# Patient Record
Sex: MALE | Race: WHITE | Employment: FULL TIME | URBAN - METROPOLITAN AREA
[De-identification: names, ages, dates, MRNs, and addresses within clinical notes are randomized per-mention and may not be internally consistent; named-entity substitution may affect disease eponyms.]

---

## 2021-04-13 ENCOUNTER — HOSPITAL ENCOUNTER (EMERGENCY)
Age: 51
Discharge: HOME | End: 2021-04-13
Attending: EMERGENCY MEDICINE
Payer: MEDICARE

## 2021-04-13 ENCOUNTER — APPOINTMENT (OUTPATIENT)
Dept: ULTRASOUND IMAGING | Age: 51
End: 2021-04-13
Attending: EMERGENCY MEDICINE
Payer: MEDICARE

## 2021-04-13 ENCOUNTER — APPOINTMENT (OUTPATIENT)
Dept: CT IMAGING | Age: 51
End: 2021-04-13
Attending: EMERGENCY MEDICINE
Payer: MEDICARE

## 2021-04-13 VITALS
RESPIRATION RATE: 16 BRPM | HEART RATE: 72 BPM | SYSTOLIC BLOOD PRESSURE: 139 MMHG | TEMPERATURE: 98.2 F | OXYGEN SATURATION: 94 % | DIASTOLIC BLOOD PRESSURE: 85 MMHG | WEIGHT: 199.96 LBS

## 2021-04-13 DIAGNOSIS — K85.90 ACUTE PANCREATITIS, UNSPECIFIED COMPLICATION STATUS, UNSPECIFIED PANCREATITIS TYPE: Primary | ICD-10-CM

## 2021-04-13 LAB
ALBUMIN SERPL-MCNC: 3.7 G/DL (ref 3.5–5)
ALBUMIN/GLOB SERPL: 0.9 {RATIO} (ref 1.1–2.2)
ALP SERPL-CCNC: 72 U/L (ref 45–117)
ALT SERPL-CCNC: 78 U/L (ref 12–78)
ANION GAP SERPL CALC-SCNC: 10 MMOL/L (ref 5–15)
AST SERPL-CCNC: 48 U/L (ref 15–37)
ATRIAL RATE: 68 BPM
BASOPHILS # BLD: 0 K/UL (ref 0–0.1)
BASOPHILS NFR BLD: 0 % (ref 0–1)
BILIRUB SERPL-MCNC: 2 MG/DL (ref 0.2–1)
BUN SERPL-MCNC: 8 MG/DL (ref 6–20)
BUN/CREAT SERPL: 7 (ref 12–20)
CALCIUM SERPL-MCNC: 9 MG/DL (ref 8.5–10.1)
CALCULATED P AXIS, ECG09: 27 DEGREES
CALCULATED R AXIS, ECG10: -16 DEGREES
CHLORIDE SERPL-SCNC: 98 MMOL/L (ref 97–108)
CO2 SERPL-SCNC: 28 MMOL/L (ref 21–32)
CREAT SERPL-MCNC: 1.12 MG/DL (ref 0.7–1.3)
DIAGNOSIS, 93000: NORMAL
DIFFERENTIAL METHOD BLD: ABNORMAL
EOSINOPHIL # BLD: 0.2 K/UL (ref 0–0.4)
EOSINOPHIL NFR BLD: 1 % (ref 0–7)
ERYTHROCYTE [DISTWIDTH] IN BLOOD BY AUTOMATED COUNT: 12.6 % (ref 11.5–14.5)
GLOBULIN SER CALC-MCNC: 4 G/DL (ref 2–4)
GLUCOSE SERPL-MCNC: 150 MG/DL (ref 65–100)
HCT VFR BLD AUTO: 43.9 % (ref 36.6–50.3)
HGB BLD-MCNC: 15.9 G/DL (ref 12.1–17)
IMM GRANULOCYTES # BLD AUTO: 0 K/UL (ref 0–0.04)
IMM GRANULOCYTES NFR BLD AUTO: 0 % (ref 0–0.5)
LIPASE SERPL-CCNC: 2712 U/L (ref 73–393)
LYMPHOCYTES # BLD: 1.1 K/UL (ref 0.8–3.5)
LYMPHOCYTES NFR BLD: 10 % (ref 12–49)
MCH RBC QN AUTO: 34.8 PG (ref 26–34)
MCHC RBC AUTO-ENTMCNC: 36.2 G/DL (ref 30–36.5)
MCV RBC AUTO: 96.1 FL (ref 80–99)
MONOCYTES # BLD: 0.7 K/UL (ref 0–1)
MONOCYTES NFR BLD: 6 % (ref 5–13)
NEUTS SEG # BLD: 9.2 K/UL (ref 1.8–8)
NEUTS SEG NFR BLD: 82 % (ref 32–75)
NRBC # BLD: 0 K/UL (ref 0–0.01)
NRBC BLD-RTO: 0 PER 100 WBC
P-R INTERVAL, ECG05: 176 MS
PLATELET # BLD AUTO: 122 K/UL (ref 150–400)
PMV BLD AUTO: 11.5 FL (ref 8.9–12.9)
POTASSIUM SERPL-SCNC: 3.8 MMOL/L (ref 3.5–5.1)
PROT SERPL-MCNC: 7.7 G/DL (ref 6.4–8.2)
Q-T INTERVAL, ECG07: 388 MS
QRS DURATION, ECG06: 102 MS
QTC CALCULATION (BEZET), ECG08: 412 MS
RBC # BLD AUTO: 4.57 M/UL (ref 4.1–5.7)
SODIUM SERPL-SCNC: 136 MMOL/L (ref 136–145)
TROPONIN I SERPL-MCNC: <0.05 NG/ML
VENTRICULAR RATE, ECG03: 68 BPM
WBC # BLD AUTO: 11.1 K/UL (ref 4.1–11.1)

## 2021-04-13 PROCEDURE — 84484 ASSAY OF TROPONIN QUANT: CPT

## 2021-04-13 PROCEDURE — 85025 COMPLETE CBC W/AUTO DIFF WBC: CPT

## 2021-04-13 PROCEDURE — 36415 COLL VENOUS BLD VENIPUNCTURE: CPT

## 2021-04-13 PROCEDURE — 96375 TX/PRO/DX INJ NEW DRUG ADDON: CPT

## 2021-04-13 PROCEDURE — 83690 ASSAY OF LIPASE: CPT

## 2021-04-13 PROCEDURE — 74177 CT ABD & PELVIS W/CONTRAST: CPT

## 2021-04-13 PROCEDURE — 93005 ELECTROCARDIOGRAM TRACING: CPT

## 2021-04-13 PROCEDURE — 76705 ECHO EXAM OF ABDOMEN: CPT

## 2021-04-13 PROCEDURE — 96374 THER/PROPH/DIAG INJ IV PUSH: CPT

## 2021-04-13 PROCEDURE — 74011000636 HC RX REV CODE- 636: Performed by: EMERGENCY MEDICINE

## 2021-04-13 PROCEDURE — 99284 EMERGENCY DEPT VISIT MOD MDM: CPT

## 2021-04-13 PROCEDURE — 80053 COMPREHEN METABOLIC PANEL: CPT

## 2021-04-13 PROCEDURE — 74011250636 HC RX REV CODE- 250/636: Performed by: EMERGENCY MEDICINE

## 2021-04-13 RX ORDER — HYDROCODONE BITARTRATE AND ACETAMINOPHEN 5; 325 MG/1; MG/1
1 TABLET ORAL
Qty: 12 TAB | Refills: 0 | Status: SHIPPED | OUTPATIENT
Start: 2021-04-13 | End: 2021-04-16

## 2021-04-13 RX ORDER — ONDANSETRON 2 MG/ML
8 INJECTION INTRAMUSCULAR; INTRAVENOUS
Status: COMPLETED | OUTPATIENT
Start: 2021-04-13 | End: 2021-04-13

## 2021-04-13 RX ORDER — MORPHINE SULFATE 4 MG/ML
4 INJECTION INTRAVENOUS ONCE
Status: COMPLETED | OUTPATIENT
Start: 2021-04-13 | End: 2021-04-13

## 2021-04-13 RX ORDER — ONDANSETRON 4 MG/1
4 TABLET, ORALLY DISINTEGRATING ORAL
Qty: 20 TAB | Refills: 0 | Status: ON HOLD | OUTPATIENT
Start: 2021-04-13 | End: 2022-02-12 | Stop reason: SDUPTHER

## 2021-04-13 RX ORDER — FAMOTIDINE 10 MG/ML
20 INJECTION INTRAVENOUS
Status: COMPLETED | OUTPATIENT
Start: 2021-04-13 | End: 2021-04-13

## 2021-04-13 RX ADMIN — SODIUM CHLORIDE 1000 ML: 9 INJECTION, SOLUTION INTRAVENOUS at 10:42

## 2021-04-13 RX ADMIN — FAMOTIDINE 20 MG: 10 INJECTION INTRAVENOUS at 10:47

## 2021-04-13 RX ADMIN — IOPAMIDOL 100 ML: 755 INJECTION, SOLUTION INTRAVENOUS at 11:08

## 2021-04-13 RX ADMIN — MORPHINE SULFATE 4 MG: 4 INJECTION INTRAVENOUS at 10:44

## 2021-04-13 RX ADMIN — ONDANSETRON 8 MG: 2 INJECTION INTRAMUSCULAR; INTRAVENOUS at 10:43

## 2021-04-13 NOTE — ED TRIAGE NOTES
Pt reports upper abdominal pain since yesterday morning. Pt denies N, V, D. Pt ate a sub sandwich yesterday afternoon and a bowl of soup last night.

## 2021-04-13 NOTE — ED NOTES
Patient resting on stretcher in no distress. Patient states pain is improved since receiving ordered medications. No further requests or concerns at this time.

## 2021-04-13 NOTE — ED NOTES
Patient medicated with ordered IV morphine, zofran, and pepcid. Patient tolerated well. IV fluids infusing via gravity without difficulty as ordered. Call bell remains in reach. Will continue to monitor.

## 2021-04-13 NOTE — ED PROVIDER NOTES
HPI the patient has a 1 day history of epigastric pain. At times the pain radiates into his back. He denies nausea/vomiting/diarrhea, chest pain, shortness of breath or other complaints. He denies having a history of ulcer disease, gallbladder or pancreatitis. History reviewed. No pertinent past medical history. Past Surgical History:   Procedure Laterality Date    HX APPENDECTOMY           History reviewed. No pertinent family history. Social History     Socioeconomic History    Marital status:      Spouse name: Not on file    Number of children: Not on file    Years of education: Not on file    Highest education level: Not on file   Occupational History    Not on file   Social Needs    Financial resource strain: Not on file    Food insecurity     Worry: Not on file     Inability: Not on file    Transportation needs     Medical: Not on file     Non-medical: Not on file   Tobacco Use    Smoking status: Current Some Day Smoker    Smokeless tobacco: Current User   Substance and Sexual Activity    Alcohol use: Yes     Comment: 5 drinks per week    Drug use: Never    Sexual activity: Not on file   Lifestyle    Physical activity     Days per week: Not on file     Minutes per session: Not on file    Stress: Not on file   Relationships    Social connections     Talks on phone: Not on file     Gets together: Not on file     Attends Sabianist service: Not on file     Active member of club or organization: Not on file     Attends meetings of clubs or organizations: Not on file     Relationship status: Not on file    Intimate partner violence     Fear of current or ex partner: Not on file     Emotionally abused: Not on file     Physically abused: Not on file     Forced sexual activity: Not on file   Other Topics Concern    Not on file   Social History Narrative    Not on file         ALLERGIES: Patient has no known allergies. Review of Systems   Constitutional: Negative for fever. HENT: Negative for voice change. Eyes: Negative for pain. Respiratory: Negative for cough and shortness of breath. Cardiovascular: Negative for chest pain. Gastrointestinal: Positive for abdominal pain. Negative for nausea and vomiting. Genitourinary: Negative for flank pain. Musculoskeletal: Negative for back pain. Skin: Negative for rash. Neurological: Negative for headaches. Psychiatric/Behavioral: Negative for confusion. Vitals:    04/13/21 0929   BP: (!) 169/107   Pulse: 95   Resp: 16   Temp: 98.2 °F (36.8 °C)   SpO2: 98%   Weight: 90.7 kg (199 lb 15.3 oz)            Physical Exam  Constitutional:       General: He is not in acute distress. Appearance: He is well-developed. HENT:      Head: Normocephalic. Neck:      Musculoskeletal: Normal range of motion. Cardiovascular:      Rate and Rhythm: Normal rate. Pulmonary:      Effort: Pulmonary effort is normal.   Abdominal:      Palpations: Abdomen is soft. Tenderness: There is abdominal tenderness in the epigastric area. Comments: Tenderness in the epigastrium   Musculoskeletal: Normal range of motion. Skin:     General: Skin is warm and dry. Capillary Refill: Capillary refill takes less than 2 seconds. Neurological:      Mental Status: He is alert. Psychiatric:         Behavior: Behavior normal.          MDM       Procedures ED EKG interpretation:  Rhythm: NSR, rate 68. Incomplete right bundle branch block. No acute ST changes. This EKG was interpreted by Almita Dee MD,ED Provider. I discussed the patient's lab results and diagnosis, pancreatitis, and that we normally admit patients in this situation. The patient would much prefer to try outpatient treatment. So the plan at the present time is to give him IV fluids, IV pain medication and if his pain improves here we will discharge him for gastroenterology follow-up.   He understands that if his symptoms get worse or are not improving in 24 hours that he should come back.

## 2021-04-13 NOTE — ED NOTES
IV access established and blood samples obtained for ordered testing. Patient tolerated well. Updated regarding plan of care and associated time constraints. Patient verbalizes understanding and agreement to care plan.

## 2021-04-13 NOTE — ED NOTES
Dr. Jovanni Licona at bedside to discuss results and further care management with patient. Patient declines admission to [facility] for pancreatitis. Patient to be discharged per Dr. Jovanni Licona.

## 2022-02-10 ENCOUNTER — APPOINTMENT (OUTPATIENT)
Dept: ULTRASOUND IMAGING | Age: 52
DRG: 439 | End: 2022-02-10
Attending: INTERNAL MEDICINE
Payer: COMMERCIAL

## 2022-02-10 ENCOUNTER — HOSPITAL ENCOUNTER (INPATIENT)
Age: 52
LOS: 2 days | Discharge: HOME | DRG: 439 | End: 2022-02-12
Attending: EMERGENCY MEDICINE | Admitting: INTERNAL MEDICINE
Payer: COMMERCIAL

## 2022-02-10 DIAGNOSIS — K85.00 IDIOPATHIC ACUTE PANCREATITIS WITHOUT INFECTION OR NECROSIS: Primary | ICD-10-CM

## 2022-02-10 DIAGNOSIS — K85.20 ALCOHOL-INDUCED ACUTE PANCREATITIS WITHOUT INFECTION OR NECROSIS: ICD-10-CM

## 2022-02-10 PROBLEM — R03.0 ELEVATED BP WITHOUT DIAGNOSIS OF HYPERTENSION: Status: ACTIVE | Noted: 2022-02-10

## 2022-02-10 PROBLEM — K85.90 ACUTE PANCREATITIS: Status: ACTIVE | Noted: 2022-02-10

## 2022-02-10 PROBLEM — F10.10 ALCOHOL ABUSE: Status: ACTIVE | Noted: 2022-02-10

## 2022-02-10 PROBLEM — K76.0 HEPATIC STEATOSIS: Status: ACTIVE | Noted: 2022-02-10

## 2022-02-10 PROBLEM — R79.89 ABNORMAL LFTS: Status: ACTIVE | Noted: 2022-02-10

## 2022-02-10 LAB
ALBUMIN SERPL-MCNC: 3.7 G/DL (ref 3.5–5)
ALBUMIN/GLOB SERPL: 0.9 {RATIO} (ref 1.1–2.2)
ALP SERPL-CCNC: 76 U/L (ref 45–117)
ALT SERPL-CCNC: 85 U/L (ref 12–78)
ANION GAP SERPL CALC-SCNC: 9 MMOL/L (ref 5–15)
AST SERPL-CCNC: 67 U/L (ref 15–37)
BASOPHILS # BLD: 0 K/UL (ref 0–0.1)
BASOPHILS NFR BLD: 1 % (ref 0–1)
BILIRUB SERPL-MCNC: 1.4 MG/DL (ref 0.2–1)
BUN SERPL-MCNC: 8 MG/DL (ref 6–20)
BUN/CREAT SERPL: 6 (ref 12–20)
CALCIUM SERPL-MCNC: 9.6 MG/DL (ref 8.5–10.1)
CHLORIDE SERPL-SCNC: 99 MMOL/L (ref 97–108)
CO2 SERPL-SCNC: 27 MMOL/L (ref 21–32)
CREAT SERPL-MCNC: 1.25 MG/DL (ref 0.7–1.3)
DIFFERENTIAL METHOD BLD: ABNORMAL
EOSINOPHIL # BLD: 0.1 K/UL (ref 0–0.4)
EOSINOPHIL NFR BLD: 2 % (ref 0–7)
ERYTHROCYTE [DISTWIDTH] IN BLOOD BY AUTOMATED COUNT: 13.4 % (ref 11.5–14.5)
GLOBULIN SER CALC-MCNC: 4.2 G/DL (ref 2–4)
GLUCOSE SERPL-MCNC: 205 MG/DL (ref 65–100)
HCT VFR BLD AUTO: 43.9 % (ref 36.6–50.3)
HGB BLD-MCNC: 15.5 G/DL (ref 12.1–17)
IMM GRANULOCYTES # BLD AUTO: 0 K/UL (ref 0–0.04)
IMM GRANULOCYTES NFR BLD AUTO: 0 % (ref 0–0.5)
LIPASE SERPL-CCNC: >3000 U/L (ref 73–393)
LYMPHOCYTES # BLD: 1.7 K/UL (ref 0.8–3.5)
LYMPHOCYTES NFR BLD: 26 % (ref 12–49)
MCH RBC QN AUTO: 34.3 PG (ref 26–34)
MCHC RBC AUTO-ENTMCNC: 35.3 G/DL (ref 30–36.5)
MCV RBC AUTO: 97.1 FL (ref 80–99)
MONOCYTES # BLD: 0.5 K/UL (ref 0–1)
MONOCYTES NFR BLD: 8 % (ref 5–13)
NEUTS SEG # BLD: 4.2 K/UL (ref 1.8–8)
NEUTS SEG NFR BLD: 63 % (ref 32–75)
NRBC # BLD: 0 K/UL (ref 0–0.01)
NRBC BLD-RTO: 0 PER 100 WBC
PLATELET # BLD AUTO: 148 K/UL (ref 150–400)
PMV BLD AUTO: 10.9 FL (ref 8.9–12.9)
POTASSIUM SERPL-SCNC: 3.7 MMOL/L (ref 3.5–5.1)
PROT SERPL-MCNC: 7.9 G/DL (ref 6.4–8.2)
RBC # BLD AUTO: 4.52 M/UL (ref 4.1–5.7)
SODIUM SERPL-SCNC: 135 MMOL/L (ref 136–145)
TRIGL SERPL-MCNC: 116 MG/DL (ref ?–150)
WBC # BLD AUTO: 6.6 K/UL (ref 4.1–11.1)

## 2022-02-10 PROCEDURE — 80053 COMPREHEN METABOLIC PANEL: CPT

## 2022-02-10 PROCEDURE — 96361 HYDRATE IV INFUSION ADD-ON: CPT

## 2022-02-10 PROCEDURE — 85025 COMPLETE CBC W/AUTO DIFF WBC: CPT

## 2022-02-10 PROCEDURE — 99283 EMERGENCY DEPT VISIT LOW MDM: CPT

## 2022-02-10 PROCEDURE — 74011250636 HC RX REV CODE- 250/636: Performed by: INTERNAL MEDICINE

## 2022-02-10 PROCEDURE — 76700 US EXAM ABDOM COMPLETE: CPT

## 2022-02-10 PROCEDURE — 83690 ASSAY OF LIPASE: CPT

## 2022-02-10 PROCEDURE — 74011000250 HC RX REV CODE- 250: Performed by: INTERNAL MEDICINE

## 2022-02-10 PROCEDURE — 94761 N-INVAS EAR/PLS OXIMETRY MLT: CPT

## 2022-02-10 PROCEDURE — 96375 TX/PRO/DX INJ NEW DRUG ADDON: CPT

## 2022-02-10 PROCEDURE — 84478 ASSAY OF TRIGLYCERIDES: CPT

## 2022-02-10 PROCEDURE — 96374 THER/PROPH/DIAG INJ IV PUSH: CPT

## 2022-02-10 PROCEDURE — 74011250636 HC RX REV CODE- 250/636: Performed by: EMERGENCY MEDICINE

## 2022-02-10 PROCEDURE — 36415 COLL VENOUS BLD VENIPUNCTURE: CPT

## 2022-02-10 PROCEDURE — 65270000029 HC RM PRIVATE

## 2022-02-10 PROCEDURE — 74011250637 HC RX REV CODE- 250/637: Performed by: INTERNAL MEDICINE

## 2022-02-10 RX ORDER — LORAZEPAM 2 MG/ML
2 INJECTION INTRAMUSCULAR
Status: DISCONTINUED | OUTPATIENT
Start: 2022-02-10 | End: 2022-02-12 | Stop reason: HOSPADM

## 2022-02-10 RX ORDER — HYDRALAZINE HYDROCHLORIDE 20 MG/ML
10 INJECTION INTRAMUSCULAR; INTRAVENOUS
Status: DISCONTINUED | OUTPATIENT
Start: 2022-02-10 | End: 2022-02-12 | Stop reason: HOSPADM

## 2022-02-10 RX ORDER — LORAZEPAM 2 MG/ML
1 INJECTION INTRAMUSCULAR ONCE
Status: COMPLETED | OUTPATIENT
Start: 2022-02-10 | End: 2022-02-10

## 2022-02-10 RX ORDER — HYDROMORPHONE HYDROCHLORIDE 2 MG/ML
1.5 INJECTION, SOLUTION INTRAMUSCULAR; INTRAVENOUS; SUBCUTANEOUS
Status: DISCONTINUED | OUTPATIENT
Start: 2022-02-10 | End: 2022-02-11

## 2022-02-10 RX ORDER — OXYCODONE AND ACETAMINOPHEN 5; 325 MG/1; MG/1
2 TABLET ORAL
Status: DISCONTINUED | OUTPATIENT
Start: 2022-02-10 | End: 2022-02-10

## 2022-02-10 RX ORDER — OXYCODONE HYDROCHLORIDE 5 MG/1
10 TABLET ORAL
Status: DISCONTINUED | OUTPATIENT
Start: 2022-02-10 | End: 2022-02-12 | Stop reason: HOSPADM

## 2022-02-10 RX ORDER — LORAZEPAM 2 MG/ML
4 INJECTION INTRAMUSCULAR
Status: DISCONTINUED | OUTPATIENT
Start: 2022-02-10 | End: 2022-02-12 | Stop reason: HOSPADM

## 2022-02-10 RX ORDER — ONDANSETRON 2 MG/ML
4 INJECTION INTRAMUSCULAR; INTRAVENOUS
Status: DISCONTINUED | OUTPATIENT
Start: 2022-02-10 | End: 2022-02-12 | Stop reason: HOSPADM

## 2022-02-10 RX ORDER — KETOROLAC TROMETHAMINE 30 MG/ML
15 INJECTION, SOLUTION INTRAMUSCULAR; INTRAVENOUS
Status: COMPLETED | OUTPATIENT
Start: 2022-02-10 | End: 2022-02-10

## 2022-02-10 RX ORDER — HYDROMORPHONE HYDROCHLORIDE 1 MG/ML
1 INJECTION, SOLUTION INTRAMUSCULAR; INTRAVENOUS; SUBCUTANEOUS ONCE
Status: COMPLETED | OUTPATIENT
Start: 2022-02-10 | End: 2022-02-10

## 2022-02-10 RX ORDER — ACETAMINOPHEN 650 MG/1
650 SUPPOSITORY RECTAL
Status: DISCONTINUED | OUTPATIENT
Start: 2022-02-10 | End: 2022-02-12 | Stop reason: HOSPADM

## 2022-02-10 RX ORDER — ONDANSETRON 2 MG/ML
4 INJECTION INTRAMUSCULAR; INTRAVENOUS
Status: COMPLETED | OUTPATIENT
Start: 2022-02-10 | End: 2022-02-10

## 2022-02-10 RX ORDER — SODIUM CHLORIDE 0.9 % (FLUSH) 0.9 %
5-40 SYRINGE (ML) INJECTION AS NEEDED
Status: DISCONTINUED | OUTPATIENT
Start: 2022-02-10 | End: 2022-02-12 | Stop reason: HOSPADM

## 2022-02-10 RX ORDER — ONDANSETRON 4 MG/1
4 TABLET, ORALLY DISINTEGRATING ORAL
Status: DISCONTINUED | OUTPATIENT
Start: 2022-02-10 | End: 2022-02-12 | Stop reason: HOSPADM

## 2022-02-10 RX ORDER — POLYETHYLENE GLYCOL 3350 17 G/17G
17 POWDER, FOR SOLUTION ORAL DAILY PRN
Status: DISCONTINUED | OUTPATIENT
Start: 2022-02-10 | End: 2022-02-12 | Stop reason: HOSPADM

## 2022-02-10 RX ORDER — SODIUM CHLORIDE, SODIUM LACTATE, POTASSIUM CHLORIDE, CALCIUM CHLORIDE 600; 310; 30; 20 MG/100ML; MG/100ML; MG/100ML; MG/100ML
100 INJECTION, SOLUTION INTRAVENOUS CONTINUOUS
Status: DISCONTINUED | OUTPATIENT
Start: 2022-02-10 | End: 2022-02-12 | Stop reason: HOSPADM

## 2022-02-10 RX ORDER — HYDROMORPHONE HYDROCHLORIDE 1 MG/ML
1 INJECTION, SOLUTION INTRAMUSCULAR; INTRAVENOUS; SUBCUTANEOUS
Status: COMPLETED | OUTPATIENT
Start: 2022-02-10 | End: 2022-02-10

## 2022-02-10 RX ORDER — ENOXAPARIN SODIUM 100 MG/ML
40 INJECTION SUBCUTANEOUS DAILY
Status: DISCONTINUED | OUTPATIENT
Start: 2022-02-11 | End: 2022-02-12 | Stop reason: HOSPADM

## 2022-02-10 RX ORDER — HYDROMORPHONE HYDROCHLORIDE 1 MG/ML
1 INJECTION, SOLUTION INTRAMUSCULAR; INTRAVENOUS; SUBCUTANEOUS
Status: DISCONTINUED | OUTPATIENT
Start: 2022-02-10 | End: 2022-02-10

## 2022-02-10 RX ORDER — ACETAMINOPHEN 325 MG/1
650 TABLET ORAL
Status: DISCONTINUED | OUTPATIENT
Start: 2022-02-10 | End: 2022-02-12 | Stop reason: HOSPADM

## 2022-02-10 RX ORDER — SODIUM CHLORIDE 0.9 % (FLUSH) 0.9 %
5-40 SYRINGE (ML) INJECTION EVERY 8 HOURS
Status: DISCONTINUED | OUTPATIENT
Start: 2022-02-10 | End: 2022-02-12 | Stop reason: HOSPADM

## 2022-02-10 RX ORDER — FENTANYL CITRATE 50 UG/ML
50 INJECTION, SOLUTION INTRAMUSCULAR; INTRAVENOUS
Status: DISCONTINUED | OUTPATIENT
Start: 2022-02-10 | End: 2022-02-10

## 2022-02-10 RX ADMIN — HYDROMORPHONE HYDROCHLORIDE 1 MG: 1 INJECTION, SOLUTION INTRAMUSCULAR; INTRAVENOUS; SUBCUTANEOUS at 13:40

## 2022-02-10 RX ADMIN — HYDROMORPHONE HYDROCHLORIDE 1 MG: 1 INJECTION, SOLUTION INTRAMUSCULAR; INTRAVENOUS; SUBCUTANEOUS at 12:33

## 2022-02-10 RX ADMIN — OXYCODONE 10 MG: 5 TABLET ORAL at 17:05

## 2022-02-10 RX ADMIN — SODIUM CHLORIDE, POTASSIUM CHLORIDE, SODIUM LACTATE AND CALCIUM CHLORIDE 1000 ML: 600; 310; 30; 20 INJECTION, SOLUTION INTRAVENOUS at 11:31

## 2022-02-10 RX ADMIN — HYDRALAZINE HYDROCHLORIDE 10 MG: 20 INJECTION INTRAMUSCULAR; INTRAVENOUS at 16:37

## 2022-02-10 RX ADMIN — FENTANYL CITRATE 50 MCG: 50 INJECTION, SOLUTION INTRAMUSCULAR; INTRAVENOUS at 11:33

## 2022-02-10 RX ADMIN — THIAMINE HYDROCHLORIDE: 100 INJECTION, SOLUTION INTRAMUSCULAR; INTRAVENOUS at 16:36

## 2022-02-10 RX ADMIN — ONDANSETRON 4 MG: 2 INJECTION INTRAMUSCULAR; INTRAVENOUS at 11:33

## 2022-02-10 RX ADMIN — HYDROMORPHONE HYDROCHLORIDE 1.5 MG: 2 INJECTION, SOLUTION INTRAMUSCULAR; INTRAVENOUS; SUBCUTANEOUS at 21:35

## 2022-02-10 RX ADMIN — SODIUM CHLORIDE, POTASSIUM CHLORIDE, SODIUM LACTATE AND CALCIUM CHLORIDE 125 ML/HR: 600; 310; 30; 20 INJECTION, SOLUTION INTRAVENOUS at 15:11

## 2022-02-10 RX ADMIN — ONDANSETRON 4 MG: 2 INJECTION INTRAMUSCULAR; INTRAVENOUS at 20:43

## 2022-02-10 RX ADMIN — HYDROMORPHONE HYDROCHLORIDE 1.5 MG: 2 INJECTION, SOLUTION INTRAMUSCULAR; INTRAVENOUS; SUBCUTANEOUS at 18:02

## 2022-02-10 RX ADMIN — SODIUM CHLORIDE, PRESERVATIVE FREE 10 ML: 5 INJECTION INTRAVENOUS at 21:22

## 2022-02-10 RX ADMIN — KETOROLAC TROMETHAMINE 15 MG: 30 INJECTION, SOLUTION INTRAMUSCULAR; INTRAVENOUS at 12:35

## 2022-02-10 RX ADMIN — HYDROMORPHONE HYDROCHLORIDE 1 MG: 1 INJECTION, SOLUTION INTRAMUSCULAR; INTRAVENOUS; SUBCUTANEOUS at 15:11

## 2022-02-10 RX ADMIN — OXYCODONE 10 MG: 5 TABLET ORAL at 20:36

## 2022-02-10 RX ADMIN — HYDROMORPHONE HYDROCHLORIDE 1 MG: 1 INJECTION, SOLUTION INTRAMUSCULAR; INTRAVENOUS; SUBCUTANEOUS at 12:06

## 2022-02-10 RX ADMIN — LORAZEPAM 1 MG: 2 INJECTION INTRAMUSCULAR; INTRAVENOUS at 18:01

## 2022-02-10 NOTE — ED TRIAGE NOTES
Patient presents ambulatory to treatment area with a steady gait. Patient complains of acute upper abdominal/epigastric pain that began about an hour ago. Patient states he has vomited twice and remains nauseated. Has history of pancreatitis and states this feels similar. Last food was last night; tried gingerale this morning, but vomited directly after ingesting.

## 2022-02-10 NOTE — PROGRESS NOTES
TRANSFER - IN REPORT:    Verbal report received from April Wood(name) on Marchelle Odanah  being received from ED(unit) for routine progression of care      Report consisted of patients Situation, Background, Assessment and   Recommendations(SBAR). Information from the following report(s) SBAR, Kardex, ED Summary, Intake/Output, MAR and Recent Results was reviewed with the receiving nurse. Opportunity for questions and clarification was provided. Assessment completed upon patients arrival to unit and care assumed.

## 2022-02-10 NOTE — H&P
[address]  [address] [place] [address]  [phone]    Admission History and Physical      NAME:  Toshia Mcelroy   :   1970   MRN:  [id number]     PCP:  Live Peace MD     Date of service:  2/10/2022         Subjective:     CHIEF COMPLAINT: Abdominal pain, nausea, vomiting    HISTORY OF PRESENT ILLNESS:     Mr. Shavon Haynes is a 46 y.o.  male who is admitted with recurrent pancreatitis. Mr. Shavon Haynes with past medical history of alcohol abuse, pancreatitis presented to ER complaining of epigastric abdominal pain, which started this morning. The pain is sharp, severe intensity associated with nausea and vomiting. Denies fever or diarrhea. Patient drinks alcohol most of the time, drinks beer and liquor. Past Medical History:   Diagnosis Date    Pancreatitis         Past Surgical History:   Procedure Laterality Date    HX APPENDECTOMY         Social History     Tobacco Use    Smoking status: Current Every Day Smoker     Packs/day: 0.25    Smokeless tobacco: Current User   Substance Use Topics    Alcohol use: Yes     Alcohol/week: 10.0 standard drinks     Types: 10 Shots of liquor per week        History reviewed. No pertinent family history. No Known Allergies     Prior to Admission medications    Medication Sig Start Date End Date Taking? Authorizing Provider   ondansetron (ZOFRAN ODT) 4 mg disintegrating tablet Take 1 Tab by mouth every eight (8) hours as needed for Nausea.   Patient not taking: Reported on 2/10/2022 4/13/21   Fred Cheng MD         Review of Systems:  (bold if positive, if negative)    Gen:  Eyes:  ENT:  CVS:  Pulm:  GI:  Abdominal pain, nausea, emesis,:  MS:  Skin:  Psych:  Endo:  Hem:  Renal:  Neuro:            Objective:      VITALS:    Vital signs reviewed; most recent are:    Visit Vitals  BP (!) 184/94 (BP 1 Location: Right upper arm, BP Patient Position: At rest)   Pulse 68   Temp 97.7 °F (36.5 °C)   Resp 20   Ht 6' 1\" (1.854 m)   Wt 86.2 kg (190 lb)   SpO2 97%   BMI 25.07 kg/m²     SpO2 Readings from Last 6 Encounters:   02/10/22 97%   04/13/21 94%            Intake/Output Summary (Last 24 hours) at 2/10/2022 1447  Last data filed at 2/10/2022 1203  Gross per 24 hour   Intake 1000 ml   Output --   Net 1000 ml            Exam:     Physical Exam:    Gen:  Well-developed, well-nourished, in no acute distress  HEENT:  Pink conjunctivae, PERRL, hearing intact to voice, moist mucous membranes  Neck:  Supple, without masses, thyroid non-tender  Resp:  No accessory muscle use, clear breath sounds without wheezes rales or rhonchi  Card:  No murmurs, normal S1, S2 without thrills, bruits or peripheral edema  Abd:  Soft, non-tender, non-distended, normoactive bowel sounds are present, no palpable organomegaly and no detectable hernias  Lymph:  No cervical or inguinal adenopathy  Musc:  No cyanosis or clubbing  Skin:  No rashes or ulcers, skin turgor is good  Neuro:  Cranial nerves are grossly intact, no focal motor weakness, follows commands appropriately  Psych:  Good insight, oriented to person, place and time, alert       Labs:    Recent Labs     02/10/22  1113   WBC 6.6   HGB 15.5   HCT 43.9   *     Recent Labs     02/10/22  1113   *   K 3.7   CL 99   CO2 27   *   BUN 8   CREA 1.25   CA 9.6   ALB 3.7   TBILI 1.4*   ALT 85*     No results found for: GLUCPOC  No results for input(s): PH, PCO2, PO2, HCO3, FIO2 in the last 72 hours. No results for input(s): INR, INREXT in the last 72 hours. Telemetry reviewed:   normal sinus rhythm       Assessment/Plan:    1. Acute alcoholic pancreatitis (7/38/2792), recurrent. Keep n.p.o., IV fluids, IV Dilaudid for pain control. Check triglyceride    2. Alcohol abuse (2/10/2022). Patient drinks beer and liquor. Denies history of withdrawal.  Start vitamins and CIWA protocol    3. Abnormal LFTs (2/10/2022). Most likely secondary to alcohol abuse.   Ultrasound of the liver shows hepatic steatosis. Strongly advised to stop drinking alcohol. 4.  Elevated BP without diagnosis of hypertension (2/10/2022). Denies history of hypertension. Most likely secondary to severe pain. Advocate control of pain.   Will give hydralazine IV as needed         Previous medical records reviewed     Risk of deterioration: high      Total time spent with patient: [address] [place] [address] discussed with: Patient, Nursing Staff and >50% of time spent in counseling and coordination of care    Discussed:  Care Plan    Prophylaxis:  Lovenox    Probable Disposition:  Home w/Family           ___________________________________________________    Attending Physician: Diamond Wallis MD

## 2022-02-10 NOTE — ED PROVIDER NOTES
The history is provided by the patient. Abdominal Pain   This is a recurrent problem. The current episode started 1 to 2 hours ago. The problem occurs constantly. The problem has been rapidly worsening. The pain is associated with vomiting and ETOH use. The pain is located in the epigastric region. Associated symptoms include anorexia and vomiting. Pertinent negatives include no fever and no chest pain. Nothing worsens the pain. The pain is relieved by nothing. Past workup includes CT scan. His past medical history is significant for pancreatitis. The patient's surgical history includes appendectomy. Past Medical History:   Diagnosis Date    Pancreatitis        Past Surgical History:   Procedure Laterality Date    HX APPENDECTOMY           History reviewed. No pertinent family history. Social History     Socioeconomic History    Marital status:      Spouse name: Not on file    Number of children: Not on file    Years of education: Not on file    Highest education level: Not on file   Occupational History    Not on file   Tobacco Use    Smoking status: Current Every Day Smoker     Packs/day: 0.25    Smokeless tobacco: Current User   Vaping Use    Vaping Use: Not on file   Substance and Sexual Activity    Alcohol use: Yes     Alcohol/week: 10.0 standard drinks     Types: 10 Shots of liquor per week    Drug use: Never    Sexual activity: Not on file   Other Topics Concern    Not on file   Social History Narrative    Not on file     Social Determinants of Health     Financial Resource Strain:     Difficulty of Paying Living Expenses: Not on file   Food Insecurity:     Worried About Running Out of Food in the Last Year: Not on file    Yisel of Food in the Last Year: Not on file   Transportation Needs:     Lack of Transportation (Medical): Not on file    Lack of Transportation (Non-Medical):  Not on file   Physical Activity:     Days of Exercise per Week: Not on file    Minutes of Exercise per Session: Not on file   Stress:     Feeling of Stress : Not on file   Social Connections:     Frequency of Communication with Friends and Family: Not on file    Frequency of Social Gatherings with Friends and Family: Not on file    Attends Church Services: Not on file    Active Member of Clubs or Organizations: Not on file    Attends Club or Organization Meetings: Not on file    Marital Status: Not on file   Intimate Partner Violence:     Fear of Current or Ex-Partner: Not on file    Emotionally Abused: Not on file    Physically Abused: Not on file    Sexually Abused: Not on file   Housing Stability:     Unable to Pay for Housing in the Last Year: Not on file    Number of [place] in the Last Year: Not on file    Unstable Housing in the Last Year: Not on file         ALLERGIES: Patient has no known allergies. Review of Systems   Constitutional: Negative for fever. Cardiovascular: Negative for chest pain. Gastrointestinal: Positive for abdominal pain, anorexia and vomiting. All other systems reviewed and are negative. Vitals:    02/10/22 1112 02/10/22 1125 02/10/22 1142 02/10/22 1210   BP: (!) 166/102      Pulse:       Resp:       Temp:       SpO2: 99% 100% 100% 100%   Weight:       Height:                Physical Exam  Vitals and nursing note reviewed. Constitutional:       General: He is in acute distress (due to pain). Appearance: He is well-developed. He is ill-appearing and diaphoretic. HENT:      Head: Normocephalic and atraumatic. Eyes:      Conjunctiva/sclera: Conjunctivae normal.   Neck:      Trachea: No tracheal deviation. Cardiovascular:      Rate and Rhythm: Normal rate and regular rhythm. Pulmonary:      Effort: Pulmonary effort is normal. No respiratory distress. Abdominal:      General: There is no distension. Palpations: Abdomen is soft. Tenderness: There is abdominal tenderness in the epigastric area. There is guarding.  There is no rebound. Negative signs include Means's sign. Musculoskeletal:         General: No deformity. Normal range of motion. Cervical back: Neck supple. Skin:     General: Skin is warm. Neurological:      Mental Status: He is alert. Cranial Nerves: No cranial nerve deficit. Psychiatric:         Behavior: Behavior normal.          MDM     46 y.o. male presents with second episode of acute pancreatitis. Significant pain which is difficult to control. Will admit for aggressive pain control and bowel rest as he has had high analgesia requirement and I suspect he would fail another attempt at outpatient symptom control. Procedures    Perfect Serve Consult for Admission  12:24 PM    ED Room Number: OTY26/79  Patient Name and age:  Delbert Small 46 y.o.  male  Working Diagnosis:   1.  Idiopathic acute pancreatitis without infection or necrosis        COVID-19 Suspicion:  no  Sepsis present:  no  Reassessment needed: no  Code Status:  Full Code  Readmission: no  Isolation Requirements:  no  Recommended Level of Care:  med/surg  Department:[facility] - [phone]

## 2022-02-10 NOTE — ED NOTES
Dr. Jose Sousa has spoken with patient. Patient OK with admission. Proceeding with admission process.

## 2022-02-10 NOTE — ED NOTES
TRANSFER - OUT REPORT:    Verbal report given to [facility]) on Delbert Small  being transferred to [facility], rm 506(unit) for routine progression of care       Report consisted of patients Situation, Background, Assessment and   Recommendations(SBAR). Information from the following report(s) SBAR, ED Summary, Intake/Output, MAR and Recent Results was reviewed with the receiving nurse. Lines:   Peripheral IV 02/10/22 Left Antecubital (Active)        Opportunity for questions and clarification was provided.       Patient transported with:   [facility] transport team.

## 2022-02-10 NOTE — PROGRESS NOTES
1535  Pt in 10/10 pain. Hospitalist notified, waiting on new orders to be put in. Pt said they were giving him 1mg dilaudid in ED, which wasn't helping. MD increased dose and frequency. Will continue to monitor pt's pain and respiratory status. 1637  PRN hydralazine given for /91. Recheck 1815 - /93. Dilaudid administered for 10/10 pain, will continue to monitor. 1655  Pt still in pain, with no other PRN options. MD notified - oxycodone ordered for pain, 1 time dose of ativan ordered for anxiety. Will continue to monitor pt.     1900  Bedside and Verbal shift change report given to Toña Reardon RN (oncoming nurse) by John Garcia RN (offgoing nurse). Report included the following information SBAR, Kardex, Intake/Output, MAR and Recent Results.

## 2022-02-10 NOTE — PROGRESS NOTES
2/10/2022  Reason for Admission:  Acute pancreatitis                   RUR Score:          None yet patient just admitted           Plan for utilizing home health:     No history, patient unlikely to need this admission     PCP: First and Last name:  Dipti Bills MD   Name of Practice:    Are you a current patient: Yes/No: Yes   Approximate date of last visit: Yesterday 2/9   Can you participate in a virtual visit with your PCP:                     Current Advanced Directive/Advance Care Plan: Full Code    Healthcare Decision Maker:   Click here to complete [address] including selection of the Healthcare Decision Maker Relationship (ie \"Primary\")           Patient's NOK is his wife Brooke Willoughby                  Transition of Care Plan:                    Patient lives at home with his wife and son in a 3 story home with a few steps to enter. Prior to admission patient is independent with ADLs and drives. No DME, no history of home health. Patient's emergency contact is his wife Brooke Willoughby who can be reached at [phone]. She will be taking him home from the hospital when he is ready for discharge. Patient sees Dr Madonna Harris for primary care and last saw the doctor yesterday. He gets prescriptions at [facility] [address] and they are typically covered by insurance. No identified needs at this time, we will continue to follow. Transition of Care Plan   1. Continue medical management/treatment  2. Home with family assistance when ready  3. Wife will transport at discharge  4. Follow up with PCP, specialties as needed  5. CM will continue to follow    Care Management Interventions  PCP Verified by CM: Yes (Dr Paula Christianson)  Mode of Transport at Discharge:  Other (see comment) (wife)  Transition of Care Consult (CM Consult): Discharge Planning  Physical Therapy Consult: No  Occupational Therapy Consult: No  Speech Therapy Consult: No  Support Systems: Spouse/Significant Other,Child(malathi)  Confirm Follow Up Transport: Family  Discharge Location  Patient Expects to be Discharged to[de-identified] Home with family assistance    MIKHAIL Soler

## 2022-02-10 NOTE — ED NOTES
Discharge or Transfer Assessment: Patient A&O x4 and in no distress. Physical re-examination reveals mild improvement in pts condition with reassessment of vital signs completed at the time of admission transfer and/or discharge.    Verification of hospital location [facility] and room 506 completed with EMS provider [facility] transport team.

## 2022-02-11 LAB
ALBUMIN SERPL-MCNC: 3.3 G/DL (ref 3.5–5)
ALBUMIN/GLOB SERPL: 0.9 {RATIO} (ref 1.1–2.2)
ALP SERPL-CCNC: 71 U/L (ref 45–117)
ALT SERPL-CCNC: 64 U/L (ref 12–78)
ANION GAP SERPL CALC-SCNC: 8 MMOL/L (ref 5–15)
AST SERPL-CCNC: 40 U/L (ref 15–37)
BILIRUB SERPL-MCNC: 1.4 MG/DL (ref 0.2–1)
BUN SERPL-MCNC: 6 MG/DL (ref 6–20)
BUN/CREAT SERPL: 7 (ref 12–20)
CALCIUM SERPL-MCNC: 8.6 MG/DL (ref 8.5–10.1)
CHLORIDE SERPL-SCNC: 102 MMOL/L (ref 97–108)
CO2 SERPL-SCNC: 25 MMOL/L (ref 21–32)
CREAT SERPL-MCNC: 0.91 MG/DL (ref 0.7–1.3)
ERYTHROCYTE [DISTWIDTH] IN BLOOD BY AUTOMATED COUNT: 14 % (ref 11.5–14.5)
GLOBULIN SER CALC-MCNC: 3.8 G/DL (ref 2–4)
GLUCOSE SERPL-MCNC: 174 MG/DL (ref 65–100)
HCT VFR BLD AUTO: 43.1 % (ref 36.6–50.3)
HGB BLD-MCNC: 15.4 G/DL (ref 12.1–17)
MCH RBC QN AUTO: 34.8 PG (ref 26–34)
MCHC RBC AUTO-ENTMCNC: 35.7 G/DL (ref 30–36.5)
MCV RBC AUTO: 97.5 FL (ref 80–99)
NRBC # BLD: 0 K/UL (ref 0–0.01)
NRBC BLD-RTO: 0 PER 100 WBC
PLATELET # BLD AUTO: 129 K/UL (ref 150–400)
PMV BLD AUTO: 10.9 FL (ref 8.9–12.9)
POTASSIUM SERPL-SCNC: 3.8 MMOL/L (ref 3.5–5.1)
PROT SERPL-MCNC: 7.1 G/DL (ref 6.4–8.2)
RBC # BLD AUTO: 4.42 M/UL (ref 4.1–5.7)
SODIUM SERPL-SCNC: 135 MMOL/L (ref 136–145)
WBC # BLD AUTO: 13.5 K/UL (ref 4.1–11.1)

## 2022-02-11 PROCEDURE — 36415 COLL VENOUS BLD VENIPUNCTURE: CPT

## 2022-02-11 PROCEDURE — 85027 COMPLETE CBC AUTOMATED: CPT

## 2022-02-11 PROCEDURE — 94761 N-INVAS EAR/PLS OXIMETRY MLT: CPT

## 2022-02-11 PROCEDURE — 65270000029 HC RM PRIVATE

## 2022-02-11 PROCEDURE — 74011250637 HC RX REV CODE- 250/637: Performed by: INTERNAL MEDICINE

## 2022-02-11 PROCEDURE — 74011000250 HC RX REV CODE- 250: Performed by: INTERNAL MEDICINE

## 2022-02-11 PROCEDURE — 80053 COMPREHEN METABOLIC PANEL: CPT

## 2022-02-11 PROCEDURE — 74011250636 HC RX REV CODE- 250/636: Performed by: INTERNAL MEDICINE

## 2022-02-11 RX ORDER — HYDROMORPHONE HYDROCHLORIDE 1 MG/ML
1 INJECTION, SOLUTION INTRAMUSCULAR; INTRAVENOUS; SUBCUTANEOUS
Status: DISCONTINUED | OUTPATIENT
Start: 2022-02-11 | End: 2022-02-12 | Stop reason: HOSPADM

## 2022-02-11 RX ADMIN — HYDROMORPHONE HYDROCHLORIDE 1.5 MG: 2 INJECTION, SOLUTION INTRAMUSCULAR; INTRAVENOUS; SUBCUTANEOUS at 01:18

## 2022-02-11 RX ADMIN — SODIUM CHLORIDE, POTASSIUM CHLORIDE, SODIUM LACTATE AND CALCIUM CHLORIDE 125 ML/HR: 600; 310; 30; 20 INJECTION, SOLUTION INTRAVENOUS at 01:12

## 2022-02-11 RX ADMIN — HYDROMORPHONE HYDROCHLORIDE 1 MG: 1 INJECTION, SOLUTION INTRAMUSCULAR; INTRAVENOUS; SUBCUTANEOUS at 20:32

## 2022-02-11 RX ADMIN — SODIUM CHLORIDE, PRESERVATIVE FREE 10 ML: 5 INJECTION INTRAVENOUS at 20:32

## 2022-02-11 RX ADMIN — THIAMINE HYDROCHLORIDE: 100 INJECTION, SOLUTION INTRAMUSCULAR; INTRAVENOUS at 18:11

## 2022-02-11 RX ADMIN — OXYCODONE 10 MG: 5 TABLET ORAL at 09:33

## 2022-02-11 RX ADMIN — OXYCODONE 10 MG: 5 TABLET ORAL at 13:58

## 2022-02-11 RX ADMIN — SODIUM CHLORIDE, PRESERVATIVE FREE 10 ML: 5 INJECTION INTRAVENOUS at 01:18

## 2022-02-11 RX ADMIN — SODIUM CHLORIDE, POTASSIUM CHLORIDE, SODIUM LACTATE AND CALCIUM CHLORIDE 125 ML/HR: 600; 310; 30; 20 INJECTION, SOLUTION INTRAVENOUS at 09:25

## 2022-02-11 RX ADMIN — HYDROMORPHONE HYDROCHLORIDE 1.5 MG: 2 INJECTION, SOLUTION INTRAMUSCULAR; INTRAVENOUS; SUBCUTANEOUS at 10:54

## 2022-02-11 RX ADMIN — SODIUM CHLORIDE, PRESERVATIVE FREE 10 ML: 5 INJECTION INTRAVENOUS at 06:40

## 2022-02-11 RX ADMIN — OXYCODONE 10 MG: 5 TABLET ORAL at 18:11

## 2022-02-11 RX ADMIN — OXYCODONE 10 MG: 5 TABLET ORAL at 23:58

## 2022-02-11 RX ADMIN — HYDROMORPHONE HYDROCHLORIDE 1.5 MG: 2 INJECTION, SOLUTION INTRAMUSCULAR; INTRAVENOUS; SUBCUTANEOUS at 06:39

## 2022-02-11 RX ADMIN — HYDROMORPHONE HYDROCHLORIDE 1.5 MG: 2 INJECTION, SOLUTION INTRAMUSCULAR; INTRAVENOUS; SUBCUTANEOUS at 15:16

## 2022-02-11 NOTE — ADT AUTH CERT NOTES
Comments  Comment        [address]     FACILITY NPI : [id number]  FACILITY TAX ID : [id number]     [facility]  SF 5M1 MED SURG 1  [address] [facility]  [phone]            Patient Name :Jann Acevedo   : 1970 (51 yrs)  MRN : [id number]     Patient Mailing Address [address] [facility] [16] , [address]                                                               .         Insurance Plan Payor: BLUE CROSS. Primary Coverage Subscriber ID : [id number]         Current Patient Class : INPATIENT  Admit Date : 2/10/2022     REQUESTED LEVEL OF CARE: INPATIENT [101]                                                           Diagnosis : Acute pancreatitis                          ICD10 Code : Acute pancreatitis [K85.90]        Admitting and Attending Info:  Admitting Provider : Michelle Elizondo MD     NPI: [id number]  Admitting Provider Phone. [phone]  Admitting Provider Address: SAME AS FACILITY          Patient Demographics    Patient Name   Katherin Rico Legal Sex   Male    1970 Address   [place] [address] Phone   [phone] [facility]   [phone] [facility]       Hospital Account    Name Acct ID Class Status Primary Coverage   Katherin Rico [id number] INPATIENT Open BLUE CROSS - [address] [facility]            Guarantor Account (for Hospital Account [de-identified])    Name Relation to Pt Service Area Active?  Acct Type   Katherin Rico [facility] Yes Personal/Family   Address Phone     [address] [place] [address] [phone])              Coverage Information (for Hospital Account [de-identified])    F/O Payor/Plan Subscriber  Subscriber Sex Precert #   BLUE CROSS/VA BLUE CROSS OUT OF STATE [address] [facility]    Subscriber Subscriber #   Katherin Rico [id number]   [facility] # Group Name   [id number]    Address Phone   [address]    Policy Number Status Effective Date Benefits Phone   [id number] -  -   Auth/Cert               Diagnosis     Codes Comments   Idiopathic acute pancreatitis without infection or necrosis  ICD-10-CM: K85.00   ICD-9-CM: 577.0             Admission Information    Arrival Date/Time: 02/10/2022 1057 Admit Date/Time: 02/10/2022 1103 IP Adm.  Date/Time: 02/10/2022 1228   Admission Type: Emergency Point of Origin: Non-health Care Facility/self Admit Category:    Means of Arrival: Car Primary Service: Medicine Secondary Service: N/A   Transfer Source:  Service Area: [facility] Unit: [place] [address] MED SURG 1   Admit Provider: Melania Richards MD Attending Provider: Pierce Prader, MD Referring Provider:      Admission Information    Attending Provider Admission Dx Admitted on   Melania Richards MD Acute pancreatitis 02/10/22   Service Isolation Code Status   Medicine -- Full Code   Allergies Advance Care Planning    No Known Allergies Jump to the Activity       Admission Information    Unit/Bed: [facility] [place] [address] MED SURG  Service: Medicine   Admitting provider: Melania Richards MD Phone: [phone]   Attending provider: Melania Richards MD Phone: [phone]   PCP: Alex Mack MD Phone: [phone]   Admission dx:  Patient class: I   Admission type: ER       Patient Demographics    Patient Name   Kelli Mcdonough   [id number] Legal Sex   Male    1970 Address   [place] [address] Phone   [phone] (Home)   [phone] (Mobile)     H&P Notes       H&P by Melania Richards MD at 02/10/22 475 documented on ED to Hosp-Admission (Current) from 2/10/2022 in [facility] [place] [address] MED SURG 1    Author: Melania Richards MD Author Type: Physician Filed: 02/10/22 7309   Note Status: Signed Cosign: Cosign Not Required Date of Service: 02/10/22 1447   : Melania Richards MD (Physician)                  [address] [place] [address] [address] [facility] [address]  [address] [place] [address] 19  [phone]     Admission History and Physical        NAME:            Lisbeth Kat   :               1970   MRN:               [id number]      PCP:                Jase Martin MD      Date of service:         2/10/2022          Subjective:      CHIEF COMPLAINT: Abdominal pain, nausea, vomiting     HISTORY OF PRESENT ILLNESS:     Mr. Lenore Villeda is a 46 y.o.  male who is admitted with recurrent pancreatitis. Mr. Lenore Villeda with past medical history of alcohol abuse, pancreatitis presented to ER complaining of epigastric abdominal pain, which started this morning. The pain is sharp, severe intensity associated with nausea and vomiting. Denies fever or diarrhea. Patient drinks alcohol most of the time, drinks beer and liquor.             Past Medical History:   Diagnosis Date    Pancreatitis                 Past Surgical History:   Procedure Laterality Date    HX APPENDECTOMY             Social History      Tobacco Use    Smoking status: Current Every Day Smoker       Packs/day: 0.25    Smokeless tobacco: Current User   Substance Use Topics    Alcohol use: Yes       Alcohol/week: 10.0 standard drinks       Types: 10 Shots of liquor per week         History reviewed. No pertinent family history.      No Known Allergies              Prior to Admission medications    Medication Sig Start Date End Date Taking? Authorizing Provider   ondansetron (ZOFRAN ODT) 4 mg disintegrating tablet Take 1 Tab by mouth every eight (8) hours as needed for Nausea.   Patient not taking: Reported on 2/10/2022 4/13/21     Park Ybarra MD            Review of Systems:  (bold if positive, if negative)     Gen:    Eyes:    ENT:    CVS:    Pulm:    GI:  Abdominal pain, nausea, emesis,  :    MS:    Skin:    Psych:    Endo:    Hem:    Renal:    Neuro:               Objective:       VITALS:    Vital signs reviewed; most recent are:     Visit Vitals  BP (!) 184/94 (BP 1 Location: Right upper arm, BP Patient Position: At rest)   Pulse 68   Temp 97.7 °F (36.5 °C)   Resp 20   Ht 6' 1\" (1.854 m)   Wt 86.2 kg (190 lb)   SpO2 97%   BMI 25.07 kg/m²          SpO2 Readings from Last 6 Encounters:   02/10/22 97%   04/13/21 94%              Intake/Output Summary (Last 24 hours) at 2/10/2022 1447  Last data filed at 2/10/2022 1203      Gross per 24 hour   Intake 1000 ml   Output --   Net 1000 ml               Exam:      Physical Exam:     Gen:  Well-developed, well-nourished, in no acute distress  HEENT:  Pink conjunctivae, PERRL, hearing intact to voice, moist mucous membranes  Neck:  Supple, without masses, thyroid non-tender  Resp:  No accessory muscle use, clear breath sounds without wheezes rales or rhonchi  Card:  No murmurs, normal S1, S2 without thrills, bruits or peripheral edema  Abd:  Soft, non-tender, non-distended, normoactive bowel sounds are present, no palpable organomegaly and no detectable hernias  Lymph:  No cervical or inguinal adenopathy  Musc:  No cyanosis or clubbing  Skin:  No rashes or ulcers, skin turgor is good  Neuro:  Cranial nerves are grossly intact, no focal motor weakness, follows commands appropriately  Psych:  Good insight, oriented to person, place and time, alert        Labs:         Recent Labs     02/10/22  1113   WBC 6.6   HGB 15.5   HCT 43.9   *          Recent Labs     02/10/22  1113   *   K 3.7   CL 99   CO2 27   *   BUN 8   CREA 1.25   CA 9.6   ALB 3.7   TBILI 1.4*   ALT 85*      No results found for: GLUCPOC  No results for input(s): PH, PCO2, PO2, HCO3, FIO2 in the last 72 hours. No results for input(s): INR, INREXT in the last 72 hours.     Telemetry reviewed:   normal sinus rhythm         Assessment/Plan:    1. Acute alcoholic pancreatitis (2/55/7065), recurrent. Keep n.p.o., IV fluids, IV Dilaudid for pain control. Check triglyceride     2. Alcohol abuse (2/10/2022). Patient drinks beer and liquor.   Denies history of withdrawal.  Start vitamins and CIWA protocol     3. Abnormal LFTs (2/10/2022). Most likely secondary to alcohol abuse. Ultrasound of the liver shows hepatic steatosis. Strongly advised to stop drinking alcohol.     4.  Elevated BP without diagnosis of hypertension (2/10/2022). Denies history of hypertension. Most likely secondary to severe pain. Advocate control of pain.   Will give hydralazine IV as needed           Previous medical records reviewed      Risk of deterioration: high         Total time spent with patient: [address] discussed with: Patient, Nursing Staff and >50% of time spent in counseling and coordination of care     Discussed:  Care Plan     Prophylaxis:  Lovenox     Probable Disposition:  Home w/Family                                                                                        ___________________________________________________     Attending Physician:   Loralie Severs, MD                Patient Demographics    [facility]   [id number] Legal Sex   Male    1970 Address   [place] [address] Phone   [phone] (Home)   [phone] (Mobile)   CSN:   [id number]     [facility] Date: Admit Time Room Bed   Feb 10, 2022 11:03  [05804] 01 [[id number]]       Attending Providers    Provider Pager From To   Geno Montilla MD  02/10/22 02/10/22   Paulino Ralph MD  02/10/22      Emergency Contact(s)    Name Relation Home Work Mobile   Anamika Baker Spouse   [phone]     Utilization Reviews         Pancreatitis - Care Day 2 (2022) by Getachew Paul RN       Review Entered Review Status   2022 12:30 Completed      Criteria Review      Care Day: 2 Care Date: 2022 Level of Care: Inpatient Floor    Guideline Day 2    Level Of Care    (X) ICU or floor    2022 12:30:35 EST by Janey Mata Tasia      medical    Clinical Status    ( ) * Hemodynamic stability    2/11/2022 12:30:35 EST by Kyung Mandel      98.5;106;157/101;16;95% RA    ( ) * Pain absent or reduced    2/11/2022 12:30:35 EST by Kyung Mandel      managed    Activity    (X) Activity as tolerated    2/11/2022 12:30:35 EST by Kyung Mandel      up ad hai    Routes    ( ) Possible oral hydration    2/11/2022 12:30:35 EST by Kyung Mandel      npo    (X) Possible oral medications    2/11/2022 12:30:35 EST by Kyung Mandel      oxyCODONE IR (ROXICODONE) tablet 10 mg  Dose: 10 mg  Freq: EVERY 4 HOURS AS NEEDED Route: PO X 1    ( ) Oral diet as tolerated    2/11/2022 12:30:35 EST by Kyung Mandel      npo    Interventions    (X) * NG tube absent    (X) Laboratory tests    2/11/2022 12:30:35 EST by Kyung Mandel      attached    (X) Observe for alcohol withdrawal if indicated    2/11/2022 12:30:35 EST by Kyung Mandel      CIWA    Medications    (X) Parenteral analgesics    2/11/2022 12:30:35 EST by Kyung Mandel      HYDROmorphone (DILAUDID) injection 1.5 mg  Dose: 1.5 mg  Freq: EVERY 3 HOURS AS NEEDED Route: IV X 3    * Milestone   Additional Notes   Day 2   2/11/22   In pt medical         Medications   HYDROmorphone (DILAUDID) injection 1.5 mg   Dose: 1.5 mg   Freq: EVERY 3 HOURS AS NEEDED Route: IV X 3      lactated Ringers infusion   Rate: 125 mL/hr Dose: 125 mL/hr   Freq: CONTINUOUS Route: IV      oxyCODONE IR (ROXICODONE) tablet 10 mg   Dose: 10 mg   Freq: EVERY 4 HOURS AS NEEDED Route: PO X 1      Vitals   98.5;106;157/101;16;95% RA      Labs      WBC 13.5 (H)   MCH 34.8 (H)   PLATELET 814 (L)   Sodium 135 (L)   Glucose 174 (H)   BUN/Creatinine ratio 7 (L)   Bilirubin, total 1.4 (H)   Albumin 3.3 (L)   Globulin 3.8   A-G Ratio 0.9 (L)   ALT 64   AST 40 (H)      Attending   Chief Complaint[de-identified] Patient was seen and examined as a follow up for pancreatitis. Hyun Noguera was reviewed. c/o severe abdominal pain       Abd:  Soft, non-tender, non-distended, normoactive bowel sounds are present, no palpable organomegaly and no detectable hernias   Acute alcoholic pancreatitis (7/53/4220), recurrent. Martina Luis n.p.o., IV fluids, IV Dilaudid for pain control.  Triglyceride are Ok            Pancreatitis - Care Day 1 (2/10/2022) by Linda Nagy RN       Review Entered Review Status   2/10/2022 16:33 Completed      Criteria Review      Care Day: 1 Care Date: 2/10/2022 Level of Care: Inpatient Floor    Guideline Day 1    Level Of Care    (X) ICU or floor    2/10/2022 16:33:46 EST by Linda Nagy      medical    Clinical Status    (X) * Clinical Indications met    2/10/2022 16:33:46 EST by Linda Nagy      Acute alcoholic pancreatitis (8/58/0810), recurrent. Martina Smith n.p.o., IV fluids, IV Dilaudid for pain control.  Check triglyceride    (X) Pain, fever, or vomiting    2/10/2022 16:33:46 EST by Linda Nagy      pain    Activity    ( ) Bed rest    2/10/2022 16:33:46 EST by Linda Nagy      up with assistance    Routes    (X) NPO, enteral, or oral feeds    2/10/2022 16:33:46 EST by Linda Nagy      npo    (X) IV fluids    2/10/2022 16:33:46 EST by Linda Nagy      lactated Ringers infusion  Rate: 125 mL/hr Dose: 125 mL/hr  Freq: CONTINUOUS Route: IV    (X) IV medications    2/10/2022 16:33:46 EST by Linda Nagy      ondansetron (ZOFRAN) injection 4 mg  Dose: 4 mg  Freq: EVERY 1 HOUR AS NEEDED Route: IV X1    HYDROmorphone (DILAUDID) injection 1 mg  Dose: 1 mg  Freq: EVERY 1 HOUR AS NEEDED Route: IV X2    Interventions    (X) Abdominal imaging    2/10/2022 16:33:46 EST by Linda Nagy      Abdominal Ultrasound  IMPRESSION  Moderate hepatic steatosis.     (X) Laboratory tests    2/10/2022 16:33:46 EST by Linda Nagy      attached    (X) Possible oxygen and pulse oximetry    2/10/2022 16:33:46 EST by Linda Nagy      routine    Medications    (X) Parenteral analgesics    2/10/2022 16:33:46 EST by Linda Nagy      HYDROmorphone (DILAUDID) injection 1 mg  Dose: 1 mg  Freq: EVERY 1 HOUR AS NEEDED Route: IV X2    * Milestone   Additional Notes   Day 1   2/10/22   In pt medical      Medications   lactated Ringers infusion   Rate: 125 mL/hr Dose: 125 mL/hr   Freq: CONTINUOUS Route: IV      ondansetron (ZOFRAN) injection 4 mg   Dose: 4 mg   Freq: EVERY 1 HOUR AS NEEDED Route: IV X1      HYDROmorphone (DILAUDID) injection 1 mg   Dose: 1 mg   Freq: EVERY 1 HOUR AS NEEDED Route: IV X2      ketorolac (TORADOL) injection 15 mg   Dose: 15 mg   Freq: NOW Route: IV      lactated ringers bolus infusion 1,000 mL   Dose: 1,000 mL   Freq: NOW Route: IV      Vitals   97.7;68;184/81;20;100% RA      Labs   Abdominal Ultrasound   IMPRESSION   Moderate hepatic steatosis. MCH 34.3 (H)   PLATELET 146 (L)   Sodium 135 (L)   Glucose 205 (H)   BUN/Creatinine ratio 6 (L)   Bilirubin, total 1.4 (H)   Globulin 4.2 (H)   A-G Ratio 0.9 (L)   ALT 85 (H)   AST 67 (H)   Lipase >3,000 (H)      ED   Patient presents ambulatory to treatment area with a steady gait.  Patient complains of acute upper abdominal/epigastric pain that began about an hour ago. Jori Mccormick states he has vomited twice and remains nauseated.  Has history of pancreatitis and states this feels similar.  Last food was last night; tried gingerale this morning, but vomited directly after ingesting. This is a recurrent problem. The current episode started 1 to 2 hours ago. The problem occurs constantly. The problem has been rapidly worsening. The pain is associated with vomiting and ETOH use. The pain is located in the epigastric region. Associated symptoms include anorexia and vomiting. Pertinent negatives include no fever and no chest pain. Nothing worsens the pain. The pain is relieved by nothing. Past workup includes CT scan. His past medical history is significant for pancreatitis. The patient's surgical history includes appendectomy.       H&P   CHIEF COMPLAINT: Abdominal pain, nausea, vomiting       HISTORY OF PRESENT ILLNESS:      Mr. Michelle Valentino is a 46 y.o. [facility] male who is admitted with recurrent pancreatitis.  Mr. Michelle Valentino with past medical history of alcohol abuse, pancreatitis presented to ER complaining of epigastric abdominal pain, which started this morning.  The pain is sharp, severe intensity associated with nausea and vomiting.  Denies fever or diarrhea.  Patient drinks alcohol most of the time, drinks beer and liquor. Physical Exam:       Gen:  Well-developed, well-nourished, in no acute distress   HEENT:  Pink conjunctivae, PERRL, hearing intact to voice, moist mucous membranes   Neck:  Supple, without masses, thyroid non-tender   Resp:  No accessory muscle use, clear breath sounds without wheezes rales or rhonchi   Card:  No murmurs, normal S1, S2 without thrills, bruits or peripheral edema   Abd:  Soft, non-tender, non-distended, normoactive bowel sounds are present, no palpable organomegaly and no detectable hernias   Lymph:  No cervical or inguinal adenopathy   Musc:  No cyanosis or clubbing   Skin:  No rashes or ulcers, skin turgor is good   Neuro:  Cranial nerves are grossly intact, no focal motor weakness, follows commands appropriately   Psych:  Good insight, oriented to person, place and time, alert      Assessment/Plan:    1.  Acute alcoholic pancreatitis (6/75/5862), recurrent. Tereasa Gammons n.p.o., IV fluids, IV Dilaudid for pain control.  Check triglyceride       2.  Alcohol abuse (2/10/2022).  Patient drinks beer and liquor.  Denies history of withdrawal.  Start vitamins and CIWA protocol       3.  Abnormal LFTs (2/10/2022).  Most likely secondary to alcohol abuse.  Ultrasound of the liver shows hepatic steatosis.  Strongly advised to stop drinking alcohol.       4.  Elevated BP without diagnosis of hypertension (2/10/2022).   Denies history of hypertension.  Most likely secondary to severe pain.  Advocate control of pain.  Will give hydralazine IV as needed                        Pancreatitis - Clinical Indications for Admission to Inpatient Care by Katharina Browning RN       Review Entered Review Status   2/10/2022 16:29 Completed      Criteria Review      Clinical Indications for Admission to Inpatient Care    Most Recent :  Katharina Browning Most Recent Date: 2/10/2022 54:63:00 EST    (X) Admission is indicated for  1 or more  of the following  (1) (2) (3) (4) (5) (6) (7):       (X) Acute pancreatitis, [A] as indicated by  2 or more  of the following :          (X) Abdominal pain (eg, epigastric, left upper quadrant)          2/10/2022 16:29:24 EST by Katharina Browning            pancreatitis presented to ER complaining of epigastric abdominal pain, which started this morning.          (X) Serum amylase or serum lipase greater than 3 times the upper limit of normal, or urinary trypsinogen-2          greater than 50 ng/mL [B] (9)          2/10/2022 16:29:24 EST by Katharina Browning            Lipase>3,000 (H)

## 2022-02-11 NOTE — PROGRESS NOTES
Physician Progress Note      Charley Guthrie  CSN #:                  [id number]  :                       1970  ADMIT DATE:       2/10/2022 11:03 AM  DISCH DATE:  RESPONDING  PROVIDER #:        Radha Ivey MD          QUERY TEXT:    Good afternoon  this patient was admitted on 02/10 with acute pancreatitis    If possible, please document in progress notes and discharge summary if you are evaluating and/or treating any of the following: The medical record reflects the following:  Risk Factors: Alcohol abuse, recurrent pancreatitis  Clinical Indicators: Elevated WBC-13.5 on admission, tachycardia up to 106 (2/4 SIRS) Lipase >3000, LFTs 85/67  Treatment: NPO, US, labs, VS monitoring, IVF, IV dilaudid for pain    Thank You  Angel Pizarro RN CDI  [phone]  Options provided:  -- SIRS of non-infectious origin due to acute pancreatitis  without acute organ dysfunction  -- Other - I will add my own diagnosis  -- Disagree - Not applicable / Not valid  -- Disagree - Clinically unable to determine / Unknown  -- Refer to Clinical Documentation Reviewer    PROVIDER RESPONSE TEXT:    This patient has SIRS of non-infectious origin due to acute pancreatitis) without acute organ dysfunction.     Query created by: Kathleen Smith on 2022 2:05 PM      Electronically signed by:  Radha Ivey MD 2022 2:13 PM

## 2022-02-11 NOTE — PROGRESS NOTES
Ezio Navarroelsen [facility] 79  [address] [facility], [place], [address] [place] [address]  [phone]      Medical Progress Note      NAME: Wilmar Yañez   :  1970  MRM:  [id number]    Date of service: 2022  11:34 AM       Assessment and Plan:   1. Acute alcoholic pancreatitis (), recurrent. Keep n.p.o., IV fluids, IV Dilaudid for pain control. Triglyceride are Ok      2. Alcohol abuse (2/10/2022). Patient drinks beer and liquor. Denies history of withdrawal.  cont vitamins and CIWA protocol     3. Abnormal LFTs (2/10/2022). Most likely secondary to alcohol abuse. Ultrasound of the liver shows hepatic steatosis. Strongly advised to stop drinking alcohol.     4.  Elevated BP without diagnosis of hypertension (2/10/2022). Denies history of hypertension. Most likely secondary to severe pain. hydralazine IV as needed          Subjective:     Chief Complaint[de-identified] Patient was seen and examined as a follow up for pancreatitis. Chart was reviewed. c/o severe abdominal pain     ROS:  (bold if positive, if negative)    Tolerating PT  Tolerating Diet        Objective:     Last 24hrs VS reviewed since prior progress note. Most recent are:    Visit Vitals  BP (!) 157/101 (BP 1 Location: Left upper arm, BP Patient Position: Sitting; At rest)   Pulse (!) 106   Temp 98.5 °F (36.9 °C)   Resp 18   Ht 6' 1\" (1.854 m)   Wt 93.1 kg (205 lb 4.8 oz)   SpO2 95%   BMI 27.09 kg/m²     SpO2 Readings from Last 6 Encounters:   22 95%   21 94%            Intake/Output Summary (Last 24 hours) at 2022 1134  Last data filed at 2022 0600  Gross per 24 hour   Intake 2425 ml   Output 0 ml   Net 2425 ml        Physical Exam:    Gen:  Well-developed, well-nourished, in no acute distress  HEENT:  Pink conjunctivae, PERRL, hearing intact to voice, moist mucous membranes  Neck:  Supple, without masses, thyroid non-tender  Resp:  No accessory muscle use, clear breath sounds without wheezes rales or rhonchi  Card:  No murmurs, normal S1, S2 without thrills, bruits or peripheral edema  Abd:  Soft, non-tender, non-distended, normoactive bowel sounds are present, no palpable organomegaly and no detectable hernias  Lymph:  No cervical or inguinal adenopathy  Musc:  No cyanosis or clubbing  Skin:  No rashes or ulcers, skin turgor is good  Neuro:  Cranial nerves are grossly intact, no focal motor weakness, follows commands appropriately  Psych:  Good insight, oriented to person, place and time, alert  __________________________________________________________________  Medications Reviewed: (see below)  Medications:     Current Facility-Administered Medications   Medication Dose Route Frequency    sodium chloride (NS) flush 5-40 mL  5-40 mL IntraVENous Q8H    sodium chloride (NS) flush 5-40 mL  5-40 mL IntraVENous PRN    acetaminophen (TYLENOL) tablet 650 mg  650 mg Oral Q6H PRN    Or    acetaminophen (TYLENOL) suppository 650 mg  650 mg Rectal Q6H PRN    polyethylene glycol (MIRALAX) packet 17 g  17 g Oral DAILY PRN    ondansetron (ZOFRAN ODT) tablet 4 mg  4 mg Oral Q8H PRN    Or    ondansetron (ZOFRAN) injection 4 mg  4 mg IntraVENous Q6H PRN    enoxaparin (LOVENOX) injection 40 mg  40 mg SubCUTAneous DAILY    lactated Ringers infusion  125 mL/hr IntraVENous CONTINUOUS    LORazepam (ATIVAN) injection 2 mg  2 mg IntraVENous Q1H PRN    LORazepam (ATIVAN) injection 4 mg  4 mg IntraVENous Q1H PRN    0.9% sodium chloride 1,000 mL with mvi (adult no. 4 with vit K) 10 mL, thiamine 013 mg, folic acid 1 mg infusion   IntraVENous Q24H    hydrALAZINE (APRESOLINE) 20 mg/mL injection 10 mg  10 mg IntraVENous Q6H PRN    HYDROmorphone (DILAUDID) injection 1.5 mg  1.5 mg IntraVENous Q3H PRN    oxyCODONE IR (ROXICODONE) tablet 10 mg  10 mg Oral Q4H PRN        Lab Data Reviewed: (see below)  Lab Review:     Recent Labs     02/11/22  0113 02/10/22  1113   WBC 13.5* 6.6   HGB 15.4 15.5   HCT 43.1 43.9   * 148* Recent Labs     02/11/22  0113 02/10/22  1113   * 135*   K 3.8 3.7    99   CO2 25 27   * 205*   BUN 6 8   CREA 0.91 1.25   CA 8.6 9.6   ALB 3.3* 3.7   TBILI 1.4* 1.4*   ALT 64 85*     No results found for: GLUCPOC  No results for input(s): PH, PCO2, PO2, HCO3, FIO2 in the last 72 hours. No results for input(s): INR, INREXT in the last 72 hours. All Micro Results     None          I have reviewed notes of prior 24hr. Other pertinent lab: Total time spent with patient: 28 I personally reviewed chart, notes, data and current medications in the medical record. I have personally examined and treated the patient at bedside during this period.                  Care Plan discussed with: Patient, Nursing Staff and >50% of time spent in counseling and coordination of care    Discussed:  Care Plan    Prophylaxis:  Lovenox    Disposition:  Home w/Family           ___________________________________________________    Attending Physician: Irma Alfaro MD

## 2022-02-11 NOTE — PROGRESS NOTES
2/11/2022  Case Management Progress Note    11:56 AM  Patient is 46year old male admitted 2/10 with pancreatitis  Patient's RUR is 4% green/low risk for readmission  Covid test: none this admission  Chart reviewed--patient discussed at IDR rounds   Per rounds this morning patient is still in significant pain, will likely be with us another day or two. No noted discharge needs, plan remains for patient to return home with family when ready. Will continue to follow. Transition of Care Plan   1. Continue medical management/treatment  2. Home with family when ready for discharge   3. Family will transport   4. Follow up with PCP, specialties as needed  5.  CM will continue to follow    MIKHAIL Garcia

## 2022-02-11 NOTE — PROGRESS NOTES
Problem: Falls - Risk of  Goal: *Absence of Falls  Outcome: Progressing Towards Goal  Note: Fall Risk Interventions:            Medication Interventions: Patient to call before getting OOB,Teach patient to arise slowly                   Problem: Pain  Goal: *Control of Pain  Outcome: Progressing Towards Goal     Problem: Patient Education: Go to Patient Education Activity  Goal: Patient/Family Education  Outcome: Progressing Towards Goal     Problem: Pain  Goal: *Control of Pain  Outcome: Progressing Towards Goal     Problem: Alcohol Withdrawal  Goal: *STG: Participates in treatment plan  Outcome: Progressing Towards Goal  Goal: *STG: Remains safe in hospital  Outcome: Progressing Towards Goal  Goal: *STG: Seeks staff when symptoms of withdrawal increase  Outcome: Progressing Towards Goal  Goal: *STG: Complies with medication therapy  Outcome: Progressing Towards Goal  Goal: *STG: Attends activities and groups  Outcome: Progressing Towards Goal  Goal: *STG: Will identify negative impact of chemical dependency including the use of tobacco, alcohol, and other substances  Outcome: Progressing Towards Goal  Goal: *STG: Verbalizes abstinence as an achievable goal  Outcome: Progressing Towards Goal  Goal: *STG: Agrees to participate in outpatient after care program to support ongoing mental health  Outcome: Progressing Towards Goal  Goal: *STG: Able to indentify relapse triggers including interpersonal/social and familial factors  Outcome: Progressing Towards Goal  Goal: *STG: Identify lifestyle changes to support long term sobriety such as vocation, employment, education, and legal issues  Outcome: Progressing Towards Goal  Goal: *STG: Maintains appropriate nutrition and hydration  Outcome: Progressing Towards Goal  Goal: *STG: Vital signs within defined limits  Outcome: Progressing Towards Goal  Goal: *STG/LTG: Relapse prevention plan in place to include housing/aftercare, leisure activities, and spirituality  Outcome: Progressing Towards Goal  Goal: Interventions  Outcome: Progressing Towards Goal     Problem: Patient Education: Go to Patient Education Activity  Goal: Patient/Family Education  Outcome: Progressing Towards Goal     Problem: Patient Education: Go to Patient Education Activity  Goal: Patient/Family Education  Outcome: Progressing Towards Goal

## 2022-02-11 NOTE — ROUTINE PROCESS
Called for pain med.stated pain scale on the 9/10 pt stated he is due for oral pain med. Roxicodone 10 mg po given. Pt state I need to come back at 2100 because according to the white board his iv pain medication is due and he wants to be top on his pain medications and want his pain medications  on scheduled. Educated and instructed pt on pain medication during shift changed.

## 2022-02-12 VITALS
TEMPERATURE: 98.9 F | HEART RATE: 95 BPM | RESPIRATION RATE: 18 BRPM | BODY MASS INDEX: 27.21 KG/M2 | HEIGHT: 73 IN | SYSTOLIC BLOOD PRESSURE: 145 MMHG | OXYGEN SATURATION: 93 % | WEIGHT: 205.3 LBS | DIASTOLIC BLOOD PRESSURE: 89 MMHG

## 2022-02-12 LAB
ALBUMIN SERPL-MCNC: 2.8 G/DL (ref 3.5–5)
ALBUMIN/GLOB SERPL: 0.7 {RATIO} (ref 1.1–2.2)
ALP SERPL-CCNC: 59 U/L (ref 45–117)
ALT SERPL-CCNC: 43 U/L (ref 12–78)
ANION GAP SERPL CALC-SCNC: 6 MMOL/L (ref 5–15)
AST SERPL-CCNC: 27 U/L (ref 15–37)
BASOPHILS # BLD: 0 K/UL (ref 0–0.1)
BASOPHILS NFR BLD: 0 % (ref 0–1)
BILIRUB SERPL-MCNC: 2.1 MG/DL (ref 0.2–1)
BUN SERPL-MCNC: 8 MG/DL (ref 6–20)
BUN/CREAT SERPL: 8 (ref 12–20)
CALCIUM SERPL-MCNC: 8.7 MG/DL (ref 8.5–10.1)
CHLORIDE SERPL-SCNC: 98 MMOL/L (ref 97–108)
CO2 SERPL-SCNC: 27 MMOL/L (ref 21–32)
CREAT SERPL-MCNC: 1.02 MG/DL (ref 0.7–1.3)
DIFFERENTIAL METHOD BLD: ABNORMAL
EOSINOPHIL # BLD: 0 K/UL (ref 0–0.4)
EOSINOPHIL NFR BLD: 0 % (ref 0–7)
ERYTHROCYTE [DISTWIDTH] IN BLOOD BY AUTOMATED COUNT: 14.1 % (ref 11.5–14.5)
GLOBULIN SER CALC-MCNC: 4 G/DL (ref 2–4)
GLUCOSE SERPL-MCNC: 131 MG/DL (ref 65–100)
HCT VFR BLD AUTO: 40.8 % (ref 36.6–50.3)
HGB BLD-MCNC: 14.4 G/DL (ref 12.1–17)
IMM GRANULOCYTES # BLD AUTO: 0.1 K/UL (ref 0–0.04)
IMM GRANULOCYTES NFR BLD AUTO: 1 % (ref 0–0.5)
LIPASE SERPL-CCNC: 677 U/L (ref 73–393)
LYMPHOCYTES # BLD: 1.1 K/UL (ref 0.8–3.5)
LYMPHOCYTES NFR BLD: 7 % (ref 12–49)
MAGNESIUM SERPL-MCNC: 1.5 MG/DL (ref 1.6–2.4)
MCH RBC QN AUTO: 34.9 PG (ref 26–34)
MCHC RBC AUTO-ENTMCNC: 35.3 G/DL (ref 30–36.5)
MCV RBC AUTO: 98.8 FL (ref 80–99)
MONOCYTES # BLD: 1 K/UL (ref 0–1)
MONOCYTES NFR BLD: 7 % (ref 5–13)
NEUTS SEG # BLD: 13.1 K/UL (ref 1.8–8)
NEUTS SEG NFR BLD: 85 % (ref 32–75)
NRBC # BLD: 0 K/UL (ref 0–0.01)
NRBC BLD-RTO: 0 PER 100 WBC
PHOSPHATE SERPL-MCNC: 2.2 MG/DL (ref 2.6–4.7)
PLATELET # BLD AUTO: 106 K/UL (ref 150–400)
PMV BLD AUTO: 11.4 FL (ref 8.9–12.9)
POTASSIUM SERPL-SCNC: 3.6 MMOL/L (ref 3.5–5.1)
PROT SERPL-MCNC: 6.8 G/DL (ref 6.4–8.2)
RBC # BLD AUTO: 4.13 M/UL (ref 4.1–5.7)
SODIUM SERPL-SCNC: 131 MMOL/L (ref 136–145)
WBC # BLD AUTO: 15.4 K/UL (ref 4.1–11.1)

## 2022-02-12 PROCEDURE — 83735 ASSAY OF MAGNESIUM: CPT

## 2022-02-12 PROCEDURE — 36415 COLL VENOUS BLD VENIPUNCTURE: CPT

## 2022-02-12 PROCEDURE — 74011000250 HC RX REV CODE- 250: Performed by: INTERNAL MEDICINE

## 2022-02-12 PROCEDURE — 94761 N-INVAS EAR/PLS OXIMETRY MLT: CPT

## 2022-02-12 PROCEDURE — 84100 ASSAY OF PHOSPHORUS: CPT

## 2022-02-12 PROCEDURE — 85025 COMPLETE CBC W/AUTO DIFF WBC: CPT

## 2022-02-12 PROCEDURE — 83690 ASSAY OF LIPASE: CPT

## 2022-02-12 PROCEDURE — 74011250636 HC RX REV CODE- 250/636: Performed by: INTERNAL MEDICINE

## 2022-02-12 PROCEDURE — 80053 COMPREHEN METABOLIC PANEL: CPT

## 2022-02-12 PROCEDURE — 74011250637 HC RX REV CODE- 250/637: Performed by: INTERNAL MEDICINE

## 2022-02-12 RX ORDER — OXYCODONE HYDROCHLORIDE 10 MG/1
10 TABLET ORAL
Qty: 12 TABLET | Refills: 0 | Status: SHIPPED | OUTPATIENT
Start: 2022-02-12 | End: 2022-02-15

## 2022-02-12 RX ORDER — LABETALOL 100 MG/1
100 TABLET, FILM COATED ORAL EVERY 12 HOURS
Qty: 60 TABLET | Refills: 0 | Status: SHIPPED | OUTPATIENT
Start: 2022-02-12

## 2022-02-12 RX ORDER — PHENOBARBITAL SODIUM 65 MG/ML
30 INJECTION INTRAMUSCULAR EVERY 12 HOURS
Status: DISCONTINUED | OUTPATIENT
Start: 2022-02-12 | End: 2022-02-12 | Stop reason: HOSPADM

## 2022-02-12 RX ORDER — ONDANSETRON 4 MG/1
4 TABLET, ORALLY DISINTEGRATING ORAL
Qty: 20 TABLET | Refills: 0 | Status: SHIPPED | OUTPATIENT
Start: 2022-02-12

## 2022-02-12 RX ADMIN — SODIUM PHOSPHATE, MONOBASIC, MONOHYDRATE: 276; 142 INJECTION, SOLUTION INTRAVENOUS at 09:04

## 2022-02-12 RX ADMIN — OXYCODONE 10 MG: 5 TABLET ORAL at 08:41

## 2022-02-12 RX ADMIN — SODIUM CHLORIDE, PRESERVATIVE FREE 10 ML: 5 INJECTION INTRAVENOUS at 04:08

## 2022-02-12 RX ADMIN — OXYCODONE 10 MG: 5 TABLET ORAL at 12:06

## 2022-02-12 RX ADMIN — SODIUM CHLORIDE, POTASSIUM CHLORIDE, SODIUM LACTATE AND CALCIUM CHLORIDE 125 ML/HR: 600; 310; 30; 20 INJECTION, SOLUTION INTRAVENOUS at 02:24

## 2022-02-12 RX ADMIN — OXYCODONE 10 MG: 5 TABLET ORAL at 04:07

## 2022-02-12 NOTE — DISCHARGE INSTRUCTIONS
HOSPITALIST DISCHARGE INSTRUCTIONS  NAME: Lolita Chicas   :  1970   MRN:  [id number]     Date/Time:  2022 11:23 AM    ADMIT DATE: 2/10/2022     DISCHARGE DATE: 2022     ADMITTING DIAGNOSIS:  Alcohol induced pancreatitis, high blood pressure     DISCHARGE DIAGNOSIS:  same    MEDICATIONS:  See after visit summary       · It is important that you take the medication exactly as they are prescribed. · Keep your medication in the bottles provided by the pharmacist and keep a list of the medication names, dosages, and times to be taken in your wallet. · Do not take other medications without consulting your doctor     Pain Management: per above medications    What to do at Home    Recommended diet:  Leticia Paden, low fat diet. No dairy     Recommended activity: Activity as tolerated    1) Return to the hospital if you feel worse    2) If you experience any of the following symptoms then please call your primary care physician or return to the emergency room if you cannot get hold of your doctor:  Fever, chills, nausea, vomiting, diarrhea, change in mentation, falling, bleeding, shortness of breath, chest pain, severe headache, severe abdominal pain,     3) Stop drinking alcohol    4) Follow up with your doctors     Follow Up: Follow-up Information     Follow up With Specialties Details Why Contact Info    Dominique Mg MD [facility] Medicine Schedule an appointment as soon as possible for a visit in 11 days  [address]  [phone]              Information obtained by :  I understand that if any problems occur once I am at home I am to contact my physician. I understand and acknowledge receipt of the instructions indicated above.                                                                                                                                            Physician's or R.N.'s Signature Date/Time                                                                                                                                              Patient or Representative Signature                                                          Date/Time      Patient Education        Learning About Alcohol Use Disorder  What is alcohol use disorder? Alcohol use disorder means that a person drinks alcohol even though it causes harm to themselves or others. It can range from mild to severe. The more signs of this disorder you have, the more severe it may be. Moderate to severe alcohol use disorder is sometimes called addiction. People who have it may find it hard to control their use of alcohol. People who have this disorder may argue with others about how much they're drinking. Their job may be affected because of drinking. They may drink when it's dangerous or illegal, such as when they drive. They also may have a strong need, or craving, to drink. They may feel like they must drink just to get by. Their drinking may increase their risk of getting hurt or being in a car crash. Over time, drinking too much alcohol may cause health problems. These may include high blood pressure, liver problems, or problems with digestion. What are the signs? Maybe you've wondered about your alcohol habits, or how to tell if your drinking is becoming a problem. Here are some of the signs of alcohol use disorder. You may have it if you have two or more of the following signs:  · You drink larger amounts of alcohol than you ever meant to. Or you've been drinking for a longer time than you ever meant to. · You can't cut down or control your use. Or you constantly wish you could cut down. · You spend a lot of time getting or drinking alcohol or recovering from its effects. · You have strong cravings for alcohol. · You can no longer do your main jobs at work, at school, or at home.   · You keep drinking alcohol, even though your use hurts your relationships. · You have stopped doing important activities because of your alcohol use. · You drink alcohol in situations where doing so is dangerous. · You keep drinking alcohol even though you know it's causing health problems. · You need more and more alcohol to get the same effect, or you get less effect from the same amount over time. This is called tolerance. · You have uncomfortable symptoms when you stop drinking alcohol or use less. This is called withdrawal.  Alcohol use disorder can range from mild to severe. The more signs you have, the more severe the disorder may be. Moderate to severe alcohol use disorder is sometimes called addiction. You might not realize that your drinking is a problem. You might not drink large amounts when you drink. Or you might go for days or weeks between drinking episodes. But even if you don't drink very often, your drinking could still be harmful and put you at risk. How is alcohol use disorder treated? Getting help is up to you. But you don't have to do it alone. There are many people and kinds of treatments that can help. Treatment for alcohol use disorder can include:  · Group therapy, one or more types of counseling, and alcohol education. · Medicines that help to:  ? Reduce withdrawal symptoms and help you safely stop drinking. ? Reduce cravings for alcohol. · Support groups. These groups include Alcoholics Anonymous and Eye[employer] (Self-Management and Recovery Training). Some people are able to stop or cut back on drinking with help from a counselor. People who have moderate to severe alcohol use disorder may need medical treatment. They may need to stay in a hospital or treatment center. You may have a treatment team to help you. This team may include a psychologist or psychiatrist, counselors, doctors, social workers, nurses, and a . A  helps plan and manage your treatment.   Follow-up care is a key part of your treatment and safety. Be sure to make and go to all appointments, and call your doctor if you are having problems. It's also a good idea to know your test results and keep a list of the medicines you take. Where can you learn more? Go to http://www.gray.com/  Enter H758 in the search box to learn more about \"Learning About Alcohol Use Disorder. \"  Current as of: February 11, 2021               Content Version: 13.0  © 2006-2021 [employer]. Care instructions adapted under license by [employer] (which disclaims liability or warranty for this information). If you have questions about a medical condition or this instruction, always ask your healthcare professional. [address] any warranty or liability for your use of this information. Patient Education        Learning About Acute Pancreatitis  What is acute pancreatitis? The pancreas is an organ behind the stomach. It makes hormones like insulin that help control how your body uses sugar. It also makes enzymes that help you digest food. Usually these enzymes flow from the pancreas to the intestines. But if they leak into the pancreas, they can irritate it and cause pain and swelling. When this happens suddenly, it's called acute pancreatitis. What causes it? Most of the time, acute pancreatitis is caused by gallstones or by heavy alcohol use. But several other things can cause it, such as:  · High levels of fats in the blood. · An injury. · A problem after a surgery or a procedure. · Certain medicines. What are the symptoms? The main symptom is pain in the upper belly. The pain can be severe. You also may have a fever, nausea, or vomiting. Some people get so sick that they have problems breathing. They also may have low blood pressure. How is it diagnosed? Your doctor will diagnose pancreatitis with an exam and by looking at your lab tests.  Your doctor may think that you have this problem based on your symptoms and where you have pain in your belly. You may have blood tests of enzymes called amylase and lipase. In pancreatitis, the level of these enzymes is usually much higher than normal.  You also may have imaging tests of the belly. These may include an ultrasound, a CT scan, or an MRI. A special MRI called MRCP can show images of the bile ducts. This test can be very helpful when gallstones are causing the problem. How is it treated? Treatment of acute pancreatitis usually takes place in the hospital. It focuses on taking care of pain and supporting your body while your pancreas heals. In severe cases, treatment may occur in an intensive care unit to support breathing, treat serious infections, or help raise very low blood pressure. If a gallstone is causing the problem, the gallstone may need to be removed. This is done during a procedure called ERCP. The doctor puts a scope in your mouth and moves it gently through the stomach and into the ducts from the pancreas and gallbladder. The doctor is then able to see a stone and remove it. Sometimes the gallbladder, which makes gallstones, needs to be removed by surgery. People with pancreatitis often need a lot of fluid to help support their other organs and their blood pressure. They get fluids through a vein (intravenous, or IV). Instead of food by mouth, nutrition is sometimes given through a vein while the pancreas heals. Where can you learn more? Go to http://www.gray.com/  Enter H134 in the search box to learn more about \"Learning About Acute Pancreatitis. \"  Current as of: February 10, 2021               Content Version: 13.0  © 2933-3587 [employer]. Care instructions adapted under license by [employer] (which disclaims liability or warranty for this information).  If you have questions about a medical condition or this instruction, always ask your healthcare professional. [address] any warranty or liability for your use of this information.

## 2022-02-12 NOTE — ROUTINE PROCESS
Bedside and Verbal shift change report given to ABI Montez (oncoming nurse) by Parker Smith (offgoing nurse). Report included the following information SBAR and Kardex.

## 2022-02-12 NOTE — ROUTINE PROCESS
Pt has water container/pitcher @ bedside trying to remove but pt stated MD told him he can have water and refused to be taken out. NPO per orders.

## 2022-02-12 NOTE — PROGRESS NOTES
Bedside shift change report given to Nereida (oncoming nurse) by Cedric Campbell (offgoing nurse). Report included the following information SBAR, Kardex, Intake/Output, MAR and Recent Results.

## 2022-02-12 NOTE — PROGRESS NOTES
2/12/2022  11:36 AM    Care Management Progress Note      ICD-10-CM ICD-9-CM    1. Idiopathic acute pancreatitis without infection or necrosis  K85.00 577.0    2. Alcohol-induced acute pancreatitis without infection or necrosis  K85.20 577.0 oxyCODONE IR (ROXICODONE) 10 mg tab immediate release tablet       RUR:  6%  Risk Level: [x]Low []Moderate []High  Value-based purchasing: [] Yes [x] No  Bundle patient: [] Yes [x] No   Specify:     Transition of care plan:  1. Medically stable with discharge order  2. Home with family assistance  3. Outpatient follow-up. 4. Pt's family to transport  5. No further CM needs identified    Care Management Interventions  PCP Verified by CM: Yes (Dr Sandy Herman)  Mode of Transport at Discharge:  Other (see comment) (wife)  Transition of Care Consult (CM Consult): Discharge Planning  Physical Therapy Consult: No  Occupational Therapy Consult: No  Speech Therapy Consult: No  Support Systems: Spouse/Significant Other,Child(malathi)  Confirm Follow Up Transport: Family  Discharge Location  Patient Expects to be Discharged to[de-identified] Home with family assistance    Jerilynn Krabbe, RN

## 2022-02-12 NOTE — DISCHARGE SUMMARY
Ezio Christianson [facility] [address]  [address] [facility], [address] [place] [address]  [phone]    Physician Discharge Summary     Patient ID:  Vivi Herrera  [id number]  37 y.o.  1970    Admit date: 2/10/2022    Discharge date and time: 2/12/2022 11:24 AM    Admission Diagnoses: Acute pancreatitis [K85.90]    Discharge Diagnoses:  Principal Diagnosis Acute pancreatitis                                            Principal Problem:    Acute pancreatitis (2/10/2022)    Active Problems:    Abnormal LFTs (2/10/2022)      Hepatic steatosis (2/10/2022)      Elevated BP without diagnosis of hypertension (2/10/2022)      Alcohol abuse (2/10/2022)           Hospital Course:     47 yo hx of etoh abuse, presented w/ abd pain, etoh pancreatitis    1) Acute alcoholic pancreatitis/acute abd pain: much improved with IVF, IV pain meds. Educated patient about etoh abuse, diet, pain control. F/u with PCP    2) Etoh abuse: no evidence of w/d. Educated patient on etoh cessation    3) HTN: likely has underlying essential HTN. Started oral labetalol      4) Hepatic steatosis/elevated LFTs: counseled on etoh cessation.   F/u with PCP    PCP: Lottie Dunlap MD     Consults: None    Significant Diagnostic Studies: none    Discharge Exam:  Physical Exam:    Gen: Well-developed, well-nourished, in no acute distress  HEENT:  Pink conjunctivae, PERRL, hearing intact to voice, moist mucous membranes  Neck: Supple, without masses, thyroid non-tender  Resp: No accessory muscle use, clear breath sounds without wheezes rales or rhonchi  Card: No murmurs, normal S1, S2 without thrills, bruits or peripheral edema  Abd:  Soft, non-tender, non-distended, normoactive bowel sounds are present  Lymph:  No cervical or inguinal adenopathy  Musc: No cyanosis or clubbing  Skin: No rashes  Neuro:  Cranial nerves are grossly intact, no focal motor weakness, follows commands appropriately  Psych:  fair insight, oriented to person, place and time, alert    Disposition: home  Discharge Condition: Stable    Patient Instructions:   Current Discharge Medication List      START taking these medications    Details   oxyCODONE IR (ROXICODONE) 10 mg tab immediate release tablet Take 1 Tablet by mouth every six (6) hours as needed for Pain for up to 3 days. Max Daily Amount: 40 mg. Indications: pain  Qty: 12 Tablet, Refills: 0    Associated Diagnoses: Alcohol-induced acute pancreatitis without infection or necrosis      labetaloL (NORMODYNE) 100 mg tablet Take 1 Tablet by mouth every twelve (12) hours. Qty: 60 Tablet, Refills: 0         CONTINUE these medications which have CHANGED    Details   ondansetron (ZOFRAN ODT) 4 mg disintegrating tablet Take 1 Tablet by mouth every eight (8) hours as needed for Nausea.   Qty: 20 Tablet, Refills: 0           Activity: Activity as tolerated  Diet: Low fat, Low cholesterol  Wound Care: None needed    Follow-up with  Follow-up Information     Follow up With Specialties Details Why Contact Michael Shaikh MD Family Medicine Schedule an appointment as soon as possible for a visit in 5 days  [place] [address]  [phone]            Follow-up tests/labs none    Signed:  John Wetzel MD  2/12/2022  11:24 AM  **I personally spent 35 min on discharge**

## 2022-02-12 NOTE — PROGRESS NOTES
Problem: Falls - Risk of  Goal: *Absence of Falls  Outcome: Progressing Towards Goal  Note: Fall Risk Interventions:            Medication Interventions: Patient to call before getting OOB,Teach patient to arise slowly                   Problem: Patient Education: Go to Patient Education Activity  Goal: Patient/Family Education  Outcome: Progressing Towards Goal     Problem: Patient Education: Go to Patient Education Activity  Goal: Patient/Family Education  Outcome: Progressing Towards Goal     Problem: Pain  Goal: *Control of Pain  Outcome: Progressing Towards Goal     Problem: Alcohol Withdrawal  Goal: *STG: Participates in treatment plan  Outcome: Progressing Towards Goal  Goal: *STG: Remains safe in hospital  Outcome: Progressing Towards Goal  Goal: *STG: Seeks staff when symptoms of withdrawal increase  Outcome: Progressing Towards Goal  Goal: *STG: Complies with medication therapy  Outcome: Progressing Towards Goal  Goal: *STG: Attends activities and groups  Outcome: Progressing Towards Goal  Goal: *STG: Will identify negative impact of chemical dependency including the use of tobacco, alcohol, and other substances  Outcome: Progressing Towards Goal  Goal: *STG: Verbalizes abstinence as an achievable goal  Outcome: Progressing Towards Goal  Goal: *STG: Agrees to participate in outpatient after care program to support ongoing mental health  Outcome: Progressing Towards Goal  Goal: *STG: Able to indentify relapse triggers including interpersonal/social and familial factors  Outcome: Progressing Towards Goal  Goal: *STG: Identify lifestyle changes to support long term sobriety such as vocation, employment, education, and legal issues  Outcome: Progressing Towards Goal  Goal: *STG: Maintains appropriate nutrition and hydration  Outcome: Progressing Towards Goal  Goal: *STG: Vital signs within defined limits  Outcome: Progressing Towards Goal  Goal: *STG/LTG: Relapse prevention plan in place to include housing/aftercare, leisure activities, and spirituality  Outcome: Progressing Towards Goal  Goal: Interventions  Outcome: Progressing Towards Goal     Problem: Alcohol Withdrawal  Goal: *STG: Participates in treatment plan  Outcome: Progressing Towards Goal  Goal: *STG: Remains safe in hospital  Outcome: Progressing Towards Goal  Goal: *STG: Seeks staff when symptoms of withdrawal increase  Outcome: Progressing Towards Goal  Goal: *STG: Complies with medication therapy  Outcome: Progressing Towards Goal  Goal: *STG: Attends activities and groups  Outcome: Progressing Towards Goal  Goal: *STG: Will identify negative impact of chemical dependency including the use of tobacco, alcohol, and other substances  Outcome: Progressing Towards Goal  Goal: *STG: Verbalizes abstinence as an achievable goal  Outcome: Progressing Towards Goal  Goal: *STG: Agrees to participate in outpatient after care program to support ongoing mental health  Outcome: Progressing Towards Goal  Goal: *STG: Able to indentify relapse triggers including interpersonal/social and familial factors  Outcome: Progressing Towards Goal  Goal: *STG: Identify lifestyle changes to support long term sobriety such as vocation, employment, education, and legal issues  Outcome: Progressing Towards Goal  Goal: *STG: Maintains appropriate nutrition and hydration  Outcome: Progressing Towards Goal  Goal: *STG: Vital signs within defined limits  Outcome: Progressing Towards Goal  Goal: *STG/LTG: Relapse prevention plan in place to include housing/aftercare, leisure activities, and spirituality  Outcome: Progressing Towards Goal  Goal: Interventions  Outcome: Progressing Towards Goal     Problem: Alcohol Withdrawal  Goal: *STG: Remains safe in hospital  Outcome: Progressing Towards Goal     Problem: Alcohol Withdrawal  Goal: *STG: Seeks staff when symptoms of withdrawal increase  Outcome: Progressing Towards Goal     Problem: Alcohol Withdrawal  Goal: *STG: Participates in treatment plan  Outcome: Progressing Towards Goal     Problem: Alcohol Withdrawal  Goal: *STG: Complies with medication therapy  Outcome: Progressing Towards Goal     Problem: Alcohol Withdrawal  Goal: *STG: Complies with medication therapy  Outcome: Progressing Towards Goal     Problem: Alcohol Withdrawal  Goal: *STG: Attends activities and groups  Outcome: Progressing Towards Goal     Problem: Alcohol Withdrawal  Goal: *STG: Will identify negative impact of chemical dependency including the use of tobacco, alcohol, and other substances  Outcome: Progressing Towards Goal     Problem: Alcohol Withdrawal  Goal: *STG: Verbalizes abstinence as an achievable goal  Outcome: Progressing Towards Goal     Problem: Alcohol Withdrawal  Goal: *STG: Agrees to participate in outpatient after care program to support ongoing mental health  Outcome: Progressing Towards Goal     Problem: Alcohol Withdrawal  Goal: *STG: Able to indentify relapse triggers including interpersonal/social and familial factors  Outcome: Progressing Towards Goal     Problem: Alcohol Withdrawal  Goal: *STG: Vital signs within defined limits  Outcome: Progressing Towards Goal

## 2022-02-12 NOTE — PROGRESS NOTES
Discharge instructions/AVS discussed in detail with pt. Pt verbalizes understanding of all instructions, including how to get new medications (paper scripts given). Pt denies any questions about instructions and has e-signed AVS. Pt discharged per MD order, being driven home by his wife. Pt was transferred to car via wheelchair, assisted by volunteer staff. Pt in no apparent distress at time of discharge and with no complaints.

## 2022-03-18 PROBLEM — F10.10 ALCOHOL ABUSE: Status: ACTIVE | Noted: 2022-02-10

## 2022-03-19 PROBLEM — R03.0 ELEVATED BP WITHOUT DIAGNOSIS OF HYPERTENSION: Status: ACTIVE | Noted: 2022-02-10

## 2022-03-19 PROBLEM — K85.90 ACUTE PANCREATITIS: Status: ACTIVE | Noted: 2022-02-10

## 2022-03-19 PROBLEM — R79.89 ABNORMAL LFTS: Status: ACTIVE | Noted: 2022-02-10

## 2022-03-19 PROBLEM — K76.0 HEPATIC STEATOSIS: Status: ACTIVE | Noted: 2022-02-10

## 2022-03-22 ENCOUNTER — OFFICE VISIT (OUTPATIENT)
Dept: ORTHOPEDIC SURGERY | Age: 52
End: 2022-03-22
Payer: COMMERCIAL

## 2022-03-22 VITALS — WEIGHT: 190 LBS | BODY MASS INDEX: 25.18 KG/M2 | HEIGHT: 73 IN

## 2022-03-22 DIAGNOSIS — M75.41 SHOULDER IMPINGEMENT, RIGHT: ICD-10-CM

## 2022-03-22 DIAGNOSIS — M25.511 ACUTE PAIN OF RIGHT SHOULDER: Primary | ICD-10-CM

## 2022-03-22 DIAGNOSIS — M75.81 ROTATOR CUFF TENDINITIS, RIGHT: ICD-10-CM

## 2022-03-22 DIAGNOSIS — M19.011 ARTHRITIS OF RIGHT ACROMIOCLAVICULAR JOINT: ICD-10-CM

## 2022-03-22 PROCEDURE — 20610 DRAIN/INJ JOINT/BURSA W/O US: CPT | Performed by: ORTHOPAEDIC SURGERY

## 2022-03-22 PROCEDURE — 99204 OFFICE O/P NEW MOD 45 MIN: CPT | Performed by: ORTHOPAEDIC SURGERY

## 2022-03-22 RX ORDER — BUPIVACAINE HYDROCHLORIDE 5 MG/ML
3 INJECTION, SOLUTION EPIDURAL; INTRACAUDAL ONCE
Status: COMPLETED | OUTPATIENT
Start: 2022-03-22 | End: 2022-03-22

## 2022-03-22 RX ORDER — METHYLPREDNISOLONE ACETATE 80 MG/ML
80 INJECTION, SUSPENSION INTRA-ARTICULAR; INTRALESIONAL; INTRAMUSCULAR; SOFT TISSUE ONCE
Status: COMPLETED | OUTPATIENT
Start: 2022-03-22 | End: 2022-03-22

## 2022-03-22 RX ADMIN — BUPIVACAINE HYDROCHLORIDE 15 MG: 5 INJECTION, SOLUTION EPIDURAL; INTRACAUDAL at 14:45

## 2022-03-22 RX ADMIN — METHYLPREDNISOLONE ACETATE 80 MG: 80 INJECTION, SUSPENSION INTRA-ARTICULAR; INTRALESIONAL; INTRAMUSCULAR; SOFT TISSUE at 14:45

## 2022-03-22 NOTE — PROGRESS NOTES
Delbert Small (: 1970) is a 46 y.o. male, patient, here for evaluation of the following chief complaint(s):  Shoulder Pain (Right)       HPI:    He began having increased right shoulder pain approximately 2 months ago. The patient reports no specific injury. He states that his pain came on gradually. He describes his right shoulder pain is moderate and sharp. His right shoulder pain continues to get worse. He reports taking no medication for his discomfort. The patient was not seen in the emergency room for his right shoulder pain and reports no previous or related right shoulder surgery. No Known Allergies    Current Outpatient Medications   Medication Sig    ondansetron (ZOFRAN ODT) 4 mg disintegrating tablet Take 1 Tablet by mouth every eight (8) hours as needed for Nausea.  labetaloL (NORMODYNE) 100 mg tablet Take 1 Tablet by mouth every twelve (12) hours. No current facility-administered medications for this visit. Past Medical History:   Diagnosis Date    Pancreatitis         Past Surgical History:   Procedure Laterality Date    HX APPENDECTOMY         History reviewed. No pertinent family history. Social History     Socioeconomic History    Marital status:      Spouse name: Not on file    Number of children: Not on file    Years of education: Not on file    Highest education level: Not on file   Occupational History    Not on file   Tobacco Use    Smoking status: Current Every Day Smoker     Packs/day: 0.25    Smokeless tobacco: Current User   Vaping Use    Vaping Use: Not on file   Substance and Sexual Activity    Alcohol use:  Yes     Alcohol/week: 10.0 standard drinks     Types: 10 Shots of liquor per week    Drug use: Never    Sexual activity: Not on file   Other Topics Concern    Not on file   Social History Narrative    Not on file     Social Determinants of Health     Financial Resource Strain:     Difficulty of Paying Living Expenses: Not on file Food Insecurity:     Worried About Running Out of Food in the Last Year: Not on file    Yisel of Food in the Last Year: Not on file   Transportation Needs:     Lack of Transportation (Medical): Not on file    Lack of Transportation (Non-Medical): Not on file   Physical Activity:     Days of Exercise per Week: Not on file    Minutes of Exercise per Session: Not on file   Stress:     Feeling of Stress : Not on file   Social Connections:     Frequency of Communication with Friends and Family: Not on file    Frequency of Social Gatherings with Friends and Family: Not on file    Attends Restorationist Services: Not on file    Active Member of [address] [place] [address] [employer] or Organizations: Not on file    Attends Club or Organization Meetings: Not on file    Marital Status: Not on file   Intimate Partner Violence:     Fear of Current or Ex-Partner: Not on file    Emotionally Abused: Not on file    Physically Abused: Not on file    Sexually Abused: Not on file   Housing Stability:     Unable to Pay for Housing in the Last Year: Not on file    Number of [place] in the Last Year: Not on file    Unstable Housing in the Last Year: Not on file       Review of Systems   All other systems reviewed and are negative. Vitals:  Ht 6' 1\" (1.854 m)   Wt 190 lb (86.2 kg)   BMI 25.07 kg/m²    Body mass index is 25.07 kg/m². Ortho Exam     The patient is well-developed and well-nourished. The patient presents today in alert and oriented x3 with a normal mood and affect. The patient stands with a normal weightbearing line and walks with a normal gait. Right shoulder is tender to palpation over the anterior supraspinatus and proximal biceps. They also are tender to palpation over the acromioclavicular joint and have discomfort with cross adduction testing. There is mild palpable crepitus in the subacromial space with ranging. The patient has limited range of motion, and discomfort with above shoulder range of motion.  The patient has discomfort with Neer and Baird impingement maneuvers and Whipple testing. The shoulder is stable on exam. They have 5/5 strength with internal and external rotation and are neurovascularly intact distally. There is no erythema, warmth or skin lesions present. ASSESSMENT/PLAN:      1. Acute pain of right shoulder  -     XR SHOULDER RT AP/LAT MIN 2 V; Future  2. Rotator cuff tendinitis, right  -     bupivacaine (PF) (MARCAINE) 0.5 % (5 mg/mL) injection 15 mg; 15 mg (3 mL), Intra artICUlar, ONCE, 1 dose, On Tue 3/22/22 at 1500  -     methylPREDNISolone acetate (DEPO-MEDROL) 80 mg/mL injection 80 mg; 80 mg, Intra artICUlar, ONCE, 1 dose, On Tue 3/22/22 at 1500  3. Shoulder impingement, right  4. Arthritis of right acromioclavicular joint     XR Results (most recent):  Results from Appointment encounter on 03/22/22    XR SHOULDER RT AP/LAT MIN 2 V    Narrative  Right shoulder 3 view x-rays show no evidence of a fracture or dislocation. Evidence of mild impingement and acromioclavicular arthritis. Glenohumeral joint space is well-maintained. Below is the assessment and plan developed based on review of pertinent history, physical exam, labs, studies, and medications. We discussed the patient's ongoing right shoulder pain and his signs, symptoms, physical exam, description of his pain, and x-rays are consistent with rotator cuff tendinitis, mild impingement, and acromioclavicular arthritis. The possible treatment options were discussed with the patient and we elected to inject his right shoulder with cortisone today to try and alleviate some of his discomfort. The risks and benefits of the injection were discussed in detail with the patient and under sterile prep the patient's right shoulder was injected with 1 cc of 80 mg/cc of methylprednisolone and 3 ccs of 0.5% Sensorcaine. He tolerated the injection well.   I did encourage him to continue to ice when possible, modify his activity level based on his right shoulder pain, and use anti-inflammatory medication when necessary. The patient will continue to work on range of motion, strengthening, and stretching exercises with an at-home exercise program as pain tolerates. I will see him back in 4 weeks for reevaluation if he continues to have persistence of his pain however, if his pain has improved and is well-maintained I will see him back on an as-needed basis at which point we discussed alternative and likely more aggressive treatment options and almost certainly obtain an MRI of his right shoulder. Return in about 4 weeks (around 4/19/2022), or if symptoms worsen or fail to improve. An electronic signature was used to authenticate this note.   -- Chris Mckeon MD

## 2022-05-24 ENCOUNTER — HOSPITAL ENCOUNTER (OUTPATIENT)
Dept: RADIATION THERAPY | Age: 52
Discharge: HOME | End: 2022-05-24

## 2024-10-10 ENCOUNTER — APPOINTMENT (OUTPATIENT)
Facility: HOSPITAL | Age: 54
End: 2024-10-10
Payer: COMMERCIAL

## 2024-10-10 ENCOUNTER — HOSPITAL ENCOUNTER (INPATIENT)
Facility: HOSPITAL | Age: 54
LOS: 1 days | Discharge: LEFT BEFORE DISCHARGE | End: 2024-10-11
Attending: STUDENT IN AN ORGANIZED HEALTH CARE EDUCATION/TRAINING PROGRAM | Admitting: FAMILY MEDICINE
Payer: COMMERCIAL

## 2024-10-10 DIAGNOSIS — R74.8 ELEVATED LIPASE: Primary | ICD-10-CM

## 2024-10-10 DIAGNOSIS — R10.13 EPIGASTRIC PAIN: ICD-10-CM

## 2024-10-10 DIAGNOSIS — D64.9 ANEMIA, UNSPECIFIED TYPE: ICD-10-CM

## 2024-10-10 DIAGNOSIS — R55 SYNCOPE AND COLLAPSE: ICD-10-CM

## 2024-10-10 DIAGNOSIS — I47.10 SVT (SUPRAVENTRICULAR TACHYCARDIA) (HCC): ICD-10-CM

## 2024-10-10 DIAGNOSIS — K66.1 HEMOPERITONEUM, NONTRAUMATIC: ICD-10-CM

## 2024-10-10 DIAGNOSIS — K86.3 PSEUDOCYST OF PANCREAS: ICD-10-CM

## 2024-10-10 DIAGNOSIS — K86.3 PANCREATIC PSEUDOCYST: ICD-10-CM

## 2024-10-10 LAB
ABO + RH BLD: NORMAL
ALBUMIN SERPL-MCNC: 3.5 G/DL (ref 3.5–5)
ALBUMIN/GLOB SERPL: 1 (ref 1.1–2.2)
ALP SERPL-CCNC: 72 U/L (ref 45–117)
ALT SERPL-CCNC: 24 U/L (ref 12–78)
ANION GAP SERPL CALC-SCNC: 8 MMOL/L (ref 2–12)
AST SERPL-CCNC: 18 U/L (ref 15–37)
BASOPHILS # BLD: 0.1 K/UL (ref 0–0.1)
BASOPHILS NFR BLD: 0 % (ref 0–1)
BILIRUB SERPL-MCNC: 1.7 MG/DL (ref 0.2–1)
BLOOD GROUP ANTIBODIES SERPL: NORMAL
BUN SERPL-MCNC: 16 MG/DL (ref 6–20)
BUN/CREAT SERPL: 16 (ref 12–20)
CALCIUM SERPL-MCNC: 9.2 MG/DL (ref 8.5–10.1)
CHLORIDE SERPL-SCNC: 101 MMOL/L (ref 97–108)
CO2 SERPL-SCNC: 25 MMOL/L (ref 21–32)
COMMENT:: NORMAL
COMMENT:: NORMAL
CREAT SERPL-MCNC: 1.02 MG/DL (ref 0.7–1.3)
DIFFERENTIAL METHOD BLD: ABNORMAL
EKG ATRIAL RATE: 121 BPM
EKG DIAGNOSIS: NORMAL
EKG P AXIS: 68 DEGREES
EKG P-R INTERVAL: 140 MS
EKG Q-T INTERVAL: 326 MS
EKG QRS DURATION: 82 MS
EKG QTC CALCULATION (BAZETT): 462 MS
EKG R AXIS: 36 DEGREES
EKG T AXIS: 50 DEGREES
EKG VENTRICULAR RATE: 121 BPM
EOSINOPHIL # BLD: 0.7 K/UL (ref 0–0.4)
EOSINOPHIL NFR BLD: 5 % (ref 0–7)
ERYTHROCYTE [DISTWIDTH] IN BLOOD BY AUTOMATED COUNT: 13.2 % (ref 11.5–14.5)
ERYTHROCYTE [DISTWIDTH] IN BLOOD BY AUTOMATED COUNT: 13.4 % (ref 11.5–14.5)
GLOBULIN SER CALC-MCNC: 3.5 G/DL (ref 2–4)
GLUCOSE SERPL-MCNC: 170 MG/DL (ref 65–100)
HCT VFR BLD AUTO: 31.9 % (ref 36.6–50.3)
HCT VFR BLD AUTO: 35 % (ref 36.6–50.3)
HGB BLD-MCNC: 10.9 G/DL (ref 12.1–17)
HGB BLD-MCNC: 12 G/DL (ref 12.1–17)
HISTORY CHECK: NORMAL
IMM GRANULOCYTES # BLD AUTO: 0.1 K/UL (ref 0–0.04)
IMM GRANULOCYTES NFR BLD AUTO: 1 % (ref 0–0.5)
LIPASE SERPL-CCNC: 213 U/L (ref 13–75)
LYMPHOCYTES # BLD: 1.5 K/UL (ref 0.8–3.5)
LYMPHOCYTES NFR BLD: 11 % (ref 12–49)
MCH RBC QN AUTO: 34 PG (ref 26–34)
MCH RBC QN AUTO: 34 PG (ref 26–34)
MCHC RBC AUTO-ENTMCNC: 34.2 G/DL (ref 30–36.5)
MCHC RBC AUTO-ENTMCNC: 34.3 G/DL (ref 30–36.5)
MCV RBC AUTO: 99.2 FL (ref 80–99)
MCV RBC AUTO: 99.4 FL (ref 80–99)
MONOCYTES # BLD: 0.8 K/UL (ref 0–1)
MONOCYTES NFR BLD: 6 % (ref 5–13)
NEUTS SEG # BLD: 10.9 K/UL (ref 1.8–8)
NEUTS SEG NFR BLD: 77 % (ref 32–75)
NRBC # BLD: 0 K/UL (ref 0–0.01)
NRBC # BLD: 0 K/UL (ref 0–0.01)
NRBC BLD-RTO: 0 PER 100 WBC
NRBC BLD-RTO: 0 PER 100 WBC
NT PRO BNP: 87 PG/ML
PLATELET # BLD AUTO: 162 K/UL (ref 150–400)
PLATELET # BLD AUTO: 175 K/UL (ref 150–400)
PMV BLD AUTO: 11.2 FL (ref 8.9–12.9)
PMV BLD AUTO: 11.3 FL (ref 8.9–12.9)
POTASSIUM SERPL-SCNC: 3.5 MMOL/L (ref 3.5–5.1)
PROT SERPL-MCNC: 7 G/DL (ref 6.4–8.2)
RBC # BLD AUTO: 3.21 M/UL (ref 4.1–5.7)
RBC # BLD AUTO: 3.53 M/UL (ref 4.1–5.7)
SODIUM SERPL-SCNC: 134 MMOL/L (ref 136–145)
SPECIMEN EXP DATE BLD: NORMAL
SPECIMEN HOLD: NORMAL
SPECIMEN HOLD: NORMAL
TROPONIN I SERPL HS-MCNC: 20 NG/L (ref 0–76)
WBC # BLD AUTO: 14 K/UL (ref 4.1–11.1)
WBC # BLD AUTO: 15.3 K/UL (ref 4.1–11.1)

## 2024-10-10 PROCEDURE — 86901 BLOOD TYPING SEROLOGIC RH(D): CPT

## 2024-10-10 PROCEDURE — 2060000000 HC ICU INTERMEDIATE R&B

## 2024-10-10 PROCEDURE — 83690 ASSAY OF LIPASE: CPT

## 2024-10-10 PROCEDURE — 1100000000 HC RM PRIVATE

## 2024-10-10 PROCEDURE — 96374 THER/PROPH/DIAG INJ IV PUSH: CPT

## 2024-10-10 PROCEDURE — 86900 BLOOD TYPING SEROLOGIC ABO: CPT

## 2024-10-10 PROCEDURE — 6360000004 HC RX CONTRAST MEDICATION: Performed by: STUDENT IN AN ORGANIZED HEALTH CARE EDUCATION/TRAINING PROGRAM

## 2024-10-10 PROCEDURE — 36415 COLL VENOUS BLD VENIPUNCTURE: CPT

## 2024-10-10 PROCEDURE — 83880 ASSAY OF NATRIURETIC PEPTIDE: CPT

## 2024-10-10 PROCEDURE — 93010 ELECTROCARDIOGRAM REPORT: CPT | Performed by: INTERNAL MEDICINE

## 2024-10-10 PROCEDURE — 86850 RBC ANTIBODY SCREEN: CPT

## 2024-10-10 PROCEDURE — 74177 CT ABD & PELVIS W/CONTRAST: CPT

## 2024-10-10 PROCEDURE — 6360000002 HC RX W HCPCS: Performed by: STUDENT IN AN ORGANIZED HEALTH CARE EDUCATION/TRAINING PROGRAM

## 2024-10-10 PROCEDURE — 93005 ELECTROCARDIOGRAM TRACING: CPT | Performed by: STUDENT IN AN ORGANIZED HEALTH CARE EDUCATION/TRAINING PROGRAM

## 2024-10-10 PROCEDURE — 84484 ASSAY OF TROPONIN QUANT: CPT

## 2024-10-10 PROCEDURE — 93005 ELECTROCARDIOGRAM TRACING: CPT | Performed by: HOSPITALIST

## 2024-10-10 PROCEDURE — 99285 EMERGENCY DEPT VISIT HI MDM: CPT

## 2024-10-10 PROCEDURE — 85027 COMPLETE CBC AUTOMATED: CPT

## 2024-10-10 PROCEDURE — 80053 COMPREHEN METABOLIC PANEL: CPT

## 2024-10-10 PROCEDURE — 85025 COMPLETE CBC W/AUTO DIFF WBC: CPT

## 2024-10-10 PROCEDURE — 74178 CT ABD&PLV WO CNTR FLWD CNTR: CPT

## 2024-10-10 PROCEDURE — 6360000002 HC RX W HCPCS

## 2024-10-10 RX ORDER — HYDROMORPHONE HYDROCHLORIDE 1 MG/ML
1 INJECTION, SOLUTION INTRAMUSCULAR; INTRAVENOUS; SUBCUTANEOUS
Status: COMPLETED | OUTPATIENT
Start: 2024-10-10 | End: 2024-10-10

## 2024-10-10 RX ORDER — FOLIC ACID 1 MG/1
1 TABLET ORAL DAILY
Status: DISCONTINUED | OUTPATIENT
Start: 2024-10-11 | End: 2024-10-11 | Stop reason: HOSPADM

## 2024-10-10 RX ORDER — LORAZEPAM 2 MG/ML
2 INJECTION INTRAMUSCULAR
Status: DISCONTINUED | OUTPATIENT
Start: 2024-10-10 | End: 2024-10-11 | Stop reason: HOSPADM

## 2024-10-10 RX ORDER — ONDANSETRON 2 MG/ML
4 INJECTION INTRAMUSCULAR; INTRAVENOUS EVERY 6 HOURS PRN
Status: DISCONTINUED | OUTPATIENT
Start: 2024-10-10 | End: 2024-10-11 | Stop reason: HOSPADM

## 2024-10-10 RX ORDER — TIZANIDINE 2 MG/1
2 TABLET ORAL NIGHTLY
COMMUNITY
Start: 2024-09-26 | End: 2024-10-26

## 2024-10-10 RX ORDER — ACETAMINOPHEN 650 MG/1
650 SUPPOSITORY RECTAL EVERY 6 HOURS PRN
Status: DISCONTINUED | OUTPATIENT
Start: 2024-10-10 | End: 2024-10-11 | Stop reason: HOSPADM

## 2024-10-10 RX ORDER — LORAZEPAM 2 MG/ML
3 INJECTION INTRAMUSCULAR
Status: DISCONTINUED | OUTPATIENT
Start: 2024-10-10 | End: 2024-10-11 | Stop reason: HOSPADM

## 2024-10-10 RX ORDER — ONDANSETRON 4 MG/1
4 TABLET, ORALLY DISINTEGRATING ORAL EVERY 8 HOURS PRN
Status: DISCONTINUED | OUTPATIENT
Start: 2024-10-10 | End: 2024-10-11 | Stop reason: HOSPADM

## 2024-10-10 RX ORDER — SODIUM CHLORIDE 9 MG/ML
INJECTION, SOLUTION INTRAVENOUS PRN
Status: DISCONTINUED | OUTPATIENT
Start: 2024-10-10 | End: 2024-10-11 | Stop reason: HOSPADM

## 2024-10-10 RX ORDER — SODIUM CHLORIDE 0.9 % (FLUSH) 0.9 %
5-40 SYRINGE (ML) INJECTION PRN
Status: DISCONTINUED | OUTPATIENT
Start: 2024-10-10 | End: 2024-10-11 | Stop reason: HOSPADM

## 2024-10-10 RX ORDER — LORAZEPAM 1 MG/1
2 TABLET ORAL
Status: DISCONTINUED | OUTPATIENT
Start: 2024-10-10 | End: 2024-10-11 | Stop reason: HOSPADM

## 2024-10-10 RX ORDER — MAGNESIUM SULFATE IN WATER 40 MG/ML
2000 INJECTION, SOLUTION INTRAVENOUS PRN
Status: DISCONTINUED | OUTPATIENT
Start: 2024-10-10 | End: 2024-10-11 | Stop reason: HOSPADM

## 2024-10-10 RX ORDER — ADENOSINE 3 MG/ML
INJECTION, SOLUTION INTRAVENOUS
Status: COMPLETED
Start: 2024-10-10 | End: 2024-10-10

## 2024-10-10 RX ORDER — LORAZEPAM 1 MG/1
3 TABLET ORAL
Status: DISCONTINUED | OUTPATIENT
Start: 2024-10-10 | End: 2024-10-11 | Stop reason: HOSPADM

## 2024-10-10 RX ORDER — NALOXONE HYDROCHLORIDE 0.4 MG/ML
0.4 INJECTION, SOLUTION INTRAMUSCULAR; INTRAVENOUS; SUBCUTANEOUS PRN
Status: DISCONTINUED | OUTPATIENT
Start: 2024-10-10 | End: 2024-10-11 | Stop reason: HOSPADM

## 2024-10-10 RX ORDER — POTASSIUM CHLORIDE 750 MG/1
40 TABLET, EXTENDED RELEASE ORAL PRN
Status: DISCONTINUED | OUTPATIENT
Start: 2024-10-10 | End: 2024-10-11 | Stop reason: HOSPADM

## 2024-10-10 RX ORDER — TADALAFIL 20 MG/1
20 TABLET ORAL PRN
COMMUNITY
Start: 2024-09-06

## 2024-10-10 RX ORDER — LORAZEPAM 2 MG/ML
1 INJECTION INTRAMUSCULAR
Status: DISCONTINUED | OUTPATIENT
Start: 2024-10-10 | End: 2024-10-11 | Stop reason: HOSPADM

## 2024-10-10 RX ORDER — LORAZEPAM 1 MG/1
4 TABLET ORAL
Status: DISCONTINUED | OUTPATIENT
Start: 2024-10-10 | End: 2024-10-11 | Stop reason: HOSPADM

## 2024-10-10 RX ORDER — SODIUM CHLORIDE, SODIUM LACTATE, POTASSIUM CHLORIDE, CALCIUM CHLORIDE 600; 310; 30; 20 MG/100ML; MG/100ML; MG/100ML; MG/100ML
INJECTION, SOLUTION INTRAVENOUS CONTINUOUS
Status: DISCONTINUED | OUTPATIENT
Start: 2024-10-10 | End: 2024-10-11 | Stop reason: HOSPADM

## 2024-10-10 RX ORDER — MULTIVITAMIN WITH IRON
1 TABLET ORAL DAILY
Status: DISCONTINUED | OUTPATIENT
Start: 2024-10-11 | End: 2024-10-11 | Stop reason: HOSPADM

## 2024-10-10 RX ORDER — LORAZEPAM 2 MG/ML
4 INJECTION INTRAMUSCULAR
Status: DISCONTINUED | OUTPATIENT
Start: 2024-10-10 | End: 2024-10-11 | Stop reason: HOSPADM

## 2024-10-10 RX ORDER — LORAZEPAM 1 MG/1
1 TABLET ORAL
Status: DISCONTINUED | OUTPATIENT
Start: 2024-10-10 | End: 2024-10-11 | Stop reason: HOSPADM

## 2024-10-10 RX ORDER — SODIUM CHLORIDE 0.9 % (FLUSH) 0.9 %
5-40 SYRINGE (ML) INJECTION EVERY 12 HOURS SCHEDULED
Status: DISCONTINUED | OUTPATIENT
Start: 2024-10-10 | End: 2024-10-11 | Stop reason: HOSPADM

## 2024-10-10 RX ORDER — POTASSIUM CHLORIDE 7.45 MG/ML
10 INJECTION INTRAVENOUS PRN
Status: DISCONTINUED | OUTPATIENT
Start: 2024-10-10 | End: 2024-10-11 | Stop reason: HOSPADM

## 2024-10-10 RX ORDER — IOPAMIDOL 755 MG/ML
100 INJECTION, SOLUTION INTRAVASCULAR
Status: COMPLETED | OUTPATIENT
Start: 2024-10-10 | End: 2024-10-10

## 2024-10-10 RX ORDER — ACETAMINOPHEN 325 MG/1
650 TABLET ORAL EVERY 6 HOURS PRN
Status: DISCONTINUED | OUTPATIENT
Start: 2024-10-10 | End: 2024-10-11 | Stop reason: HOSPADM

## 2024-10-10 RX ORDER — LANOLIN ALCOHOL/MO/W.PET/CERES
100 CREAM (GRAM) TOPICAL DAILY
Status: DISCONTINUED | OUTPATIENT
Start: 2024-10-11 | End: 2024-10-11 | Stop reason: HOSPADM

## 2024-10-10 RX ORDER — POLYETHYLENE GLYCOL 3350 17 G/17G
17 POWDER, FOR SOLUTION ORAL DAILY PRN
Status: DISCONTINUED | OUTPATIENT
Start: 2024-10-10 | End: 2024-10-11 | Stop reason: HOSPADM

## 2024-10-10 RX ORDER — MELOXICAM 7.5 MG/1
7.5 TABLET ORAL DAILY
COMMUNITY
Start: 2024-09-26 | End: 2024-10-11 | Stop reason: HOSPADM

## 2024-10-10 RX ADMIN — IOPAMIDOL 100 ML: 755 INJECTION, SOLUTION INTRAVENOUS at 19:34

## 2024-10-10 RX ADMIN — HYDROMORPHONE HYDROCHLORIDE 1 MG: 1 INJECTION, SOLUTION INTRAMUSCULAR; INTRAVENOUS; SUBCUTANEOUS at 21:45

## 2024-10-10 RX ADMIN — ADENOSINE 6 MG: 3 INJECTION, SOLUTION INTRAVENOUS at 23:31

## 2024-10-10 RX ADMIN — HYDROMORPHONE HYDROCHLORIDE 1 MG: 1 INJECTION, SOLUTION INTRAMUSCULAR; INTRAVENOUS; SUBCUTANEOUS at 23:49

## 2024-10-10 RX ADMIN — IOPAMIDOL 100 ML: 755 INJECTION, SOLUTION INTRAVENOUS at 20:57

## 2024-10-10 ASSESSMENT — PAIN - FUNCTIONAL ASSESSMENT
PAIN_FUNCTIONAL_ASSESSMENT: NONE - DENIES PAIN
PAIN_FUNCTIONAL_ASSESSMENT: PREVENTS OR INTERFERES SOME ACTIVE ACTIVITIES AND ADLS

## 2024-10-10 ASSESSMENT — PAIN DESCRIPTION - LOCATION
LOCATION: ABDOMEN
LOCATION: ABDOMEN

## 2024-10-10 ASSESSMENT — PAIN DESCRIPTION - ORIENTATION
ORIENTATION: LEFT;UPPER
ORIENTATION: MID

## 2024-10-10 ASSESSMENT — PAIN DESCRIPTION - DESCRIPTORS: DESCRIPTORS: BURNING;ACHING

## 2024-10-10 ASSESSMENT — PAIN SCALES - GENERAL
PAINLEVEL_OUTOF10: 10
PAINLEVEL_OUTOF10: 10
PAINLEVEL_OUTOF10: 5

## 2024-10-10 NOTE — ED TRIAGE NOTES
Pt arrived via EMS c/o dizziness and near syncope just PTA. Pt intermittent abdominal pain x3 days. Pt denies CP and SOB.

## 2024-10-11 VITALS
SYSTOLIC BLOOD PRESSURE: 123 MMHG | OXYGEN SATURATION: 93 % | RESPIRATION RATE: 13 BRPM | DIASTOLIC BLOOD PRESSURE: 78 MMHG | TEMPERATURE: 97.8 F | HEART RATE: 71 BPM

## 2024-10-11 LAB
ANION GAP SERPL CALC-SCNC: 4 MMOL/L (ref 2–12)
BUN SERPL-MCNC: 11 MG/DL (ref 6–20)
BUN/CREAT SERPL: 14 (ref 12–20)
CALCIUM SERPL-MCNC: 8.4 MG/DL (ref 8.5–10.1)
CHLORIDE SERPL-SCNC: 103 MMOL/L (ref 97–108)
CO2 SERPL-SCNC: 27 MMOL/L (ref 21–32)
COMMENT:: NORMAL
COMMENT:: NORMAL
CREAT SERPL-MCNC: 0.8 MG/DL (ref 0.7–1.3)
EKG ATRIAL RATE: 102 BPM
EKG DIAGNOSIS: NORMAL
EKG DIAGNOSIS: NORMAL
EKG P AXIS: 59 DEGREES
EKG P-R INTERVAL: 164 MS
EKG Q-T INTERVAL: 226 MS
EKG Q-T INTERVAL: 332 MS
EKG QRS DURATION: 84 MS
EKG QRS DURATION: 96 MS
EKG QTC CALCULATION (BAZETT): 408 MS
EKG QTC CALCULATION (BAZETT): 432 MS
EKG R AXIS: 10 DEGREES
EKG R AXIS: 21 DEGREES
EKG T AXIS: 37 DEGREES
EKG T AXIS: 44 DEGREES
EKG VENTRICULAR RATE: 102 BPM
EKG VENTRICULAR RATE: 196 BPM
GLUCOSE SERPL-MCNC: 121 MG/DL (ref 65–100)
HCT VFR BLD AUTO: 22.8 % (ref 36.6–50.3)
HCT VFR BLD AUTO: 23.9 % (ref 36.6–50.3)
HGB BLD-MCNC: 8 G/DL (ref 12.1–17)
HGB BLD-MCNC: 8.1 G/DL (ref 12.1–17)
POTASSIUM SERPL-SCNC: 3.9 MMOL/L (ref 3.5–5.1)
SODIUM SERPL-SCNC: 134 MMOL/L (ref 136–145)
SPECIMEN HOLD: NORMAL
SPECIMEN HOLD: NORMAL

## 2024-10-11 PROCEDURE — 6370000000 HC RX 637 (ALT 250 FOR IP): Performed by: FAMILY MEDICINE

## 2024-10-11 PROCEDURE — 2580000003 HC RX 258: Performed by: FAMILY MEDICINE

## 2024-10-11 PROCEDURE — 6360000002 HC RX W HCPCS: Performed by: FAMILY MEDICINE

## 2024-10-11 PROCEDURE — 36415 COLL VENOUS BLD VENIPUNCTURE: CPT

## 2024-10-11 PROCEDURE — 80048 BASIC METABOLIC PNL TOTAL CA: CPT

## 2024-10-11 PROCEDURE — 99252 IP/OBS CONSLTJ NEW/EST SF 35: CPT | Performed by: PHYSICIAN ASSISTANT

## 2024-10-11 PROCEDURE — 6370000000 HC RX 637 (ALT 250 FOR IP): Performed by: PHYSICIAN ASSISTANT

## 2024-10-11 PROCEDURE — 85018 HEMOGLOBIN: CPT

## 2024-10-11 PROCEDURE — 93010 ELECTROCARDIOGRAM REPORT: CPT | Performed by: INTERNAL MEDICINE

## 2024-10-11 PROCEDURE — 85014 HEMATOCRIT: CPT

## 2024-10-11 RX ORDER — ATENOLOL 25 MG/1
12.5 TABLET ORAL DAILY
Status: DISCONTINUED | OUTPATIENT
Start: 2024-10-11 | End: 2024-10-11 | Stop reason: HOSPADM

## 2024-10-11 RX ORDER — TRAMADOL HYDROCHLORIDE 50 MG/1
50 TABLET ORAL EVERY 6 HOURS PRN
Status: DISCONTINUED | OUTPATIENT
Start: 2024-10-11 | End: 2024-10-11 | Stop reason: HOSPADM

## 2024-10-11 RX ORDER — OXYCODONE AND ACETAMINOPHEN 5; 325 MG/1; MG/1
1 TABLET ORAL EVERY 6 HOURS PRN
Qty: 6 TABLET | Refills: 0 | Status: SHIPPED | OUTPATIENT
Start: 2024-10-11 | End: 2024-10-14

## 2024-10-11 RX ORDER — HYDROMORPHONE HYDROCHLORIDE 1 MG/ML
0.5 INJECTION, SOLUTION INTRAMUSCULAR; INTRAVENOUS; SUBCUTANEOUS EVERY 4 HOURS PRN
Status: DISCONTINUED | OUTPATIENT
Start: 2024-10-11 | End: 2024-10-11 | Stop reason: HOSPADM

## 2024-10-11 RX ORDER — OXYCODONE AND ACETAMINOPHEN 5; 325 MG/1; MG/1
1 TABLET ORAL EVERY 6 HOURS PRN
Qty: 6 TABLET | Refills: 0 | Status: SHIPPED | OUTPATIENT
Start: 2024-10-11 | End: 2024-10-11

## 2024-10-11 RX ORDER — ATENOLOL 25 MG/1
12.5 TABLET ORAL DAILY
Qty: 30 TABLET | Refills: 3 | Status: SHIPPED | OUTPATIENT
Start: 2024-10-12

## 2024-10-11 RX ORDER — OXYCODONE AND ACETAMINOPHEN 5; 325 MG/1; MG/1
1 TABLET ORAL EVERY 6 HOURS PRN
Status: DISCONTINUED | OUTPATIENT
Start: 2024-10-11 | End: 2024-10-11 | Stop reason: HOSPADM

## 2024-10-11 RX ORDER — ATENOLOL 25 MG/1
12.5 TABLET ORAL DAILY
Qty: 30 TABLET | Refills: 3 | Status: SHIPPED | OUTPATIENT
Start: 2024-10-12 | End: 2024-10-11

## 2024-10-11 RX ADMIN — Medication 100 MG: at 10:03

## 2024-10-11 RX ADMIN — HYDROMORPHONE HYDROCHLORIDE 0.5 MG: 1 INJECTION, SOLUTION INTRAMUSCULAR; INTRAVENOUS; SUBCUTANEOUS at 04:25

## 2024-10-11 RX ADMIN — Medication 10 ML: at 10:34

## 2024-10-11 RX ADMIN — FOLIC ACID 1 MG: 1 TABLET ORAL at 10:03

## 2024-10-11 RX ADMIN — THERA TABS 1 TABLET: TAB at 10:03

## 2024-10-11 RX ADMIN — OXYCODONE HYDROCHLORIDE AND ACETAMINOPHEN 1 TABLET: 5; 325 TABLET ORAL at 10:04

## 2024-10-11 RX ADMIN — SODIUM CHLORIDE, POTASSIUM CHLORIDE, SODIUM LACTATE AND CALCIUM CHLORIDE: 600; 310; 30; 20 INJECTION, SOLUTION INTRAVENOUS at 04:20

## 2024-10-11 ASSESSMENT — PAIN DESCRIPTION - DESCRIPTORS
DESCRIPTORS: ACHING;DISCOMFORT

## 2024-10-11 ASSESSMENT — PAIN SCALES - GENERAL
PAINLEVEL_OUTOF10: 7
PAINLEVEL_OUTOF10: 9
PAINLEVEL_OUTOF10: 7
PAINLEVEL_OUTOF10: 7

## 2024-10-11 ASSESSMENT — PAIN DESCRIPTION - ORIENTATION
ORIENTATION: MID
ORIENTATION: MID;LOWER
ORIENTATION: LOWER;MID

## 2024-10-11 ASSESSMENT — PAIN - FUNCTIONAL ASSESSMENT
PAIN_FUNCTIONAL_ASSESSMENT: ACTIVITIES ARE NOT PREVENTED

## 2024-10-11 ASSESSMENT — PAIN DESCRIPTION - PAIN TYPE
TYPE: ACUTE PAIN
TYPE: ACUTE PAIN

## 2024-10-11 ASSESSMENT — PAIN DESCRIPTION - LOCATION
LOCATION: ABDOMEN

## 2024-10-11 ASSESSMENT — PAIN DESCRIPTION - FREQUENCY
FREQUENCY: CONTINUOUS
FREQUENCY: CONTINUOUS

## 2024-10-11 ASSESSMENT — PAIN DESCRIPTION - ONSET
ONSET: ON-GOING
ONSET: ON-GOING

## 2024-10-11 NOTE — ED PROVIDER NOTES
associated with the branch vessel of the superior mesenteric   artery, focal area of hypodensity in the region of the splenic artery as well. A   bleeding gastric ulcer is nondistended as there is gastric wall thickening at   the level of the gastric antrum, unlikely to be the cause of hemoperitoneum.      Consider pre and postcontrast CT angiography for further delineation.    Vascular surgery/interventional radiology consultation recommended.   Incidental and/or nonemergent findings are as described above.             Electronically signed by MAGDALENA SENIOR        ED physician interpretation of imaging: Documented in ED course    Medications Given:  Medications   iopamidol (ISOVUE-370) 76 % injection 100 mL (100 mLs IntraVENous Given 10/10/24 1934)   iopamidol (ISOVUE-370) 76 % injection 100 mL (100 mLs IntraVENous Given 10/10/24 2057)   HYDROmorphone HCl PF (DILAUDID) injection 1 mg (1 mg IntraVENous Given 10/10/24 2145)   HYDROmorphone HCl PF (DILAUDID) injection 1 mg (1 mg IntraVENous Given 10/10/24 2349)   adenosine (ADENOCARD) 6 MG/2ML injection (6 mg  Given 10/10/24 2331)       Differential Diagnosis included but not limited to: Pancreatitis, syncope, abdominal pain, acute vessel bleed    Medical Decision Making  Patient is a 54-year-old male presenting to the ED with syncope and 3 days of abdominal pain.  Workup in the ED was initially concerning for active hemorrhage in the abdomen with acute hemoperitoneum.  However on CT bleeding study showed no active bleed was identified and this was most likely a ruptured hemorrhagic pseudocyst likely secondary to patient's recurrent episodes of pancreatitis from alcohol use.  IR reviewed images and did not feel there is anything to intervene upon at this time.  While in the ED patient went into SVT and was chemically converted with adenosine successfully.  Patient's hemoglobin dropped to 1.1 in the ED.  Hospital service contacted and patient admitted for further  treatment and evaluation.    Amount and/or Complexity of Data Reviewed  Labs: ordered. Decision-making details documented in ED Course.  Radiology: ordered.  ECG/medicine tests: ordered.    Risk  Prescription drug management.  Decision regarding hospitalization.      Total critical care time (not including time spent performing separately reportable procedures): 36 minutes.    Procedures       DISPOSITION: Admitted 10/10/2024 11:47:15 PM    Perfect Serve Consult for Admission  12:00 AM    ED Room Number: ER10/10  Patient Name and age:  Mich Forbes 54 y.o.  male  Working Diagnosis:   1. Elevated lipase    2. Pancreatic pseudocyst    3. Anemia, unspecified type    4. Syncope and collapse    5. Hemoperitoneum, nontraumatic    6. Epigastric pain    7. SVT (supraventricular tachycardia) (HCC)        COVID-19 Suspicion: No  Sepsis present:  No  Reassessment needed: No  Code Status:  Full Code  Readmission: No  Isolation Requirements: no  Recommended Level of Care: telemetry  Department: [facility] Adult ED - [phone]    Other: Patient presented to ED with 3 days of abdominal pain and a syncopal episode today.  No fall or significant trauma.  Initial CT scan with contrast showed possible active extra of and hemoperitoneum.  I consulted IR.  We obtained a CT bleeding study of the abdomen which shows a likely ruptured hemorrhagic pseudocyst of the pancreas.  This is consistent with patient's history of alcohol use and pancreatitis.  Hemoglobin has dropped by 1 point but no active bleeding on CT bleeding study.  Patient with significant pain.  IV Dilaudid ordered.  Type and screen sent.    Isra Tatum MD   Emergency Medicine Attending Physician        Isra Tatum MD  10/11/24 0006

## 2024-10-11 NOTE — ED NOTES
Pt back to room after ambulating to restroom with steady gait. New fitted sheet and pillow case applied. Belongings placed in reach. Pt made aware of next steps in plan of care. Denies any other needs at this time

## 2024-10-11 NOTE — H&P
patient's current clinical presentation, there is a high level of concern for decompensation if discharged from the emergency department. Complex decision making was performed, which includes reviewing the patient's available past medical records, laboratory results, and imaging studies.    Active Problems:    * No active hospital problems. *  Resolved Problems:    * No resolved hospital problems. *      Plan:     #Pancreatitis  #Ruptured hemorrhagic pancreatic pseudocyst  -no pseudoaneurysm, or active bleeding.  There was a small peripancreatic cystic/fluid density lesion extending into the gastrohepatic region with moderate volume hemoperitoneum throughout the abdomen consistent with suspected ruptured hemorrhagic pseudocyst.  -LR  - Pain control    #SVT  #Syncope  - Had episode of SVT in the ED requiring 1 dose of adenosine for conversion to sinus rhythm  - Suspected syncopal episode could be either due to arrhythmia, or hypotension from blood loss  - Continuous telemetry-echo  - Consult cardiology    #EtOH dependence  - Drinks 4-5 drinks daily including beer, and liquor  -Denies history of withdrawal  - CIWA with as needed Ativan  - Recommend cessation  -check UDS      DIET: No diet orders on file   ISOLATION PRECAUTIONS: No active isolations  CODE STATUS: Full code  DVT PROPHYLAXIS: SCD's or Sequential Compression Device  FUNCTIONAL STATUS PRIOR TO HOSPITALIZATION: Independent  Ambulatory status/function: Normal  EARLY MOBILITY ASSESSMENT: 7-complete independence  ANTICIPATED DISCHARGE: 2-3 days  ANTICIPATED DISPOSITION: Home    CRITICAL CARE WAS PERFORMED FOR THIS ENCOUNTER: 30 to 74 minutes      Signed By: Neli Lawton MD     October 10, 2024         Please note that this dictation may have been completed with Dragon, the [employer] voice recognition software.  Quite often unanticipated grammatical, syntax, homophones, and other interpretive errors are inadvertently transcribed by the computer software.   Please disregard these errors.  Please excuse any errors that have escaped final proofreading.

## 2024-10-11 NOTE — ED NOTES
2326- Dr Tatum, Deana RN, Abbie RN, Va RN, Caroline RN at bedside  2331- adenosine 6mg given   2332- ST at 123 bpm

## 2024-10-11 NOTE — CONSULTS
8.0 K/UL    Lymphocytes Absolute 1.5 0.8 - 3.5 K/UL    Monocytes Absolute 0.8 0.0 - 1.0 K/UL    Eosinophils Absolute 0.7 (H) 0.0 - 0.4 K/UL    Basophils Absolute 0.1 0.0 - 0.1 K/UL    Immature Granulocytes Absolute 0.1 (H) 0.00 - 0.04 K/UL    Differential Type AUTOMATED     Comprehensive Metabolic Panel    Collection Time: 10/10/24  6:15 PM   Result Value Ref Range    Sodium 134 (L) 136 - 145 mmol/L    Potassium 3.5 3.5 - 5.1 mmol/L    Chloride 101 97 - 108 mmol/L    CO2 25 21 - 32 mmol/L    Anion Gap 8 2 - 12 mmol/L    Glucose 170 (H) 65 - 100 mg/dL    BUN 16 6 - 20 MG/DL    Creatinine 1.02 0.70 - 1.30 MG/DL    BUN/Creatinine Ratio 16 12 - 20      Est, Glom Filt Rate 87 >60 ml/min/1.73m2    Calcium 9.2 8.5 - 10.1 MG/DL    Total Bilirubin 1.7 (H) 0.2 - 1.0 MG/DL    ALT 24 12 - 78 U/L    AST 18 15 - 37 U/L    Alk Phosphatase 72 45 - 117 U/L    Total Protein 7.0 6.4 - 8.2 g/dL    Albumin 3.5 3.5 - 5.0 g/dL    Globulin 3.5 2.0 - 4.0 g/dL    Albumin/Globulin Ratio 1.0 (L) 1.1 - 2.2     Extra Tubes Hold    Collection Time: 10/10/24  6:16 PM   Result Value Ref Range    Specimen HOld 1RED,1BLU,1DRGRN     Comment:        Add-on orders for these samples will be processed based on acceptable specimen integrity and analyte stability, which may vary by analyte.   Lipase    Collection Time: 10/10/24  6:16 PM   Result Value Ref Range    Lipase 213 (H) 13 - 75 U/L   Troponin    Collection Time: 10/10/24  6:16 PM   Result Value Ref Range    Troponin, High Sensitivity 20 0 - 76 ng/L   Brain Natriuretic Peptide    Collection Time: 10/10/24  6:16 PM   Result Value Ref Range    NT Pro-BNP 87 <125 PG/ML   PREPARE RBC (CROSSMATCH), 1 Units    Collection Time: 10/10/24  8:15 PM   Result Value Ref Range    History Check Historical check performed    TYPE AND SCREEN    Collection Time: 10/10/24  8:26 PM   Result Value Ref Range    Crossmatch expiration date 10/13/2024,0322     ABO/Rh B POSITIVE     Antibody Screen NEG    CBC    Collection  integrity and analyte stability, which may vary by analyte.   Basic Metabolic Panel    Collection Time: 10/11/24  4:17 AM   Result Value Ref Range    Sodium 134 (L) 136 - 145 mmol/L    Potassium 3.9 3.5 - 5.1 mmol/L    Chloride 103 97 - 108 mmol/L    CO2 27 21 - 32 mmol/L    Anion Gap 4 2 - 12 mmol/L    Glucose 121 (H) 65 - 100 mg/dL    BUN 11 6 - 20 MG/DL    Creatinine 0.80 0.70 - 1.30 MG/DL    BUN/Creatinine Ratio 14 12 - 20      Est, Glom Filt Rate >90 >60 ml/min/1.73m2    Calcium 8.4 (L) 8.5 - 10.1 MG/DL   Extra Tubes Hold    Collection Time: 10/11/24  8:04 AM   Result Value Ref Range    Specimen HOld 1LAV     Comment:        Add-on orders for these samples will be processed based on acceptable specimen integrity and analyte stability, which may vary by analyte.       Data Review:  ECG tracing personally reviewed:   Encounter Date: 10/10/24   EKG 12 Lead   Result Value    Ventricular Rate 102    Atrial Rate 102    P-R Interval 164    QRS Duration 84    Q-T Interval 332    QTc Calculation (Bazett) 432    P Axis 59    R Axis 10    T Axis 37    Diagnosis      Sinus tachycardia with premature atrial complexes with aberrant conduction  Otherwise normal ECG  When compared with ECG of 10-OCT-2024 23:25,  MANUAL COMPARISON REQUIRED, DATA IS UNCONFIRMED         Echocardiogram:  No results found for this or any previous visit.      Other cardiac testing:   N/A    Other imaging:   CT abd/pelvis:  IMPRESSION:  1.  No pseudoaneurysm or active bleeding.  2.  Small peripancreatic cystic/fluid density lesion extending into the  gastrohepatic region, with moderate volume hemoperitoneum throughout the abdomen. This may represent sequelae of a ruptured hemorrhagic pseudocyst.    Chandler Bustillo MD  Structural Interventional Cardiology  [facility]  10/11/24

## 2024-10-11 NOTE — DISCHARGE SUMMARY
Pt requesting to leave AMA, discussed risks of leaving too early with him, he is adamant on leaving.

## 2024-10-11 NOTE — PROGRESS NOTES
[facility]                                                                                          Hospitalist Progress Note  Emily Harding MD  Office Phone: [phone]        Date of Service:  10/11/2024  NAME:  Mich Forbes  :  1970  MRN:  [id number]       Admission Summary:   54 y.o. male with a pmhx of EtOH dependence, pancreatitis who presents with abdominal pain, and near syncope, and is being admitted for pancreatitis with suspected ruptured hemorrhagic pancreatic pseudocyst.  He initially presented with chief complaint of upper abdominal pain for 3 days.  He was at a [place] today, and had a syncopal episode.  He was lowered to the floor at the [facility], EMS was contacted and took patient to the ED for further evaluation.  He endorses drinking 4-5 drinks per day including beer, and liquor.     He states that he has had episodes of fast palpitations in the past, that are self-limited.  He has never been evaluated formally for them.     In the ED, he was initially tachycardic to 110 with other vitals stable.  While waiting for admission, he had an episode of SVT at 200 bpm with hypotension that resolved after 6 mg of adenosine.  Labs were significant for WBC 14, metastatic anemia with hemoglobin of 12, hyperglycemia with glucose 170,and  lipase 213.  CT abdomen/pelvis showed moderate to large hemoperitoneum with active extravasation suspected, and a large hematoma abutting the greater curvature of the stomach.  There is also a gastric ulcer noted.  IR was consulted, and recommended a bleeding scan which showed no pseudoaneurysm, or active bleeding.  There was a small peripancreatic cystic/fluid density lesion extending into the gastrohepatic region with moderate volume hemoperitoneum throughout the abdomen consistent with suspected ruptured hemorrhagic pseudocyst.       Interval history / Subjective:   No new complaints, feels better, pain less, wants to    Temp:    SpO2: 93%       No intake or output data in the 24 hours ending 10/11/24 1415     Physical Examination:     I had a face to face encounter with this patient and independently examined them on 10/11/2024 as outlined below:          NAD  RRR  Lungs CTA  Abd soft mild TTP  mild distention    Data Review:    Review and/or order of clinical lab test  Review and/or order of tests in the radiology section of CPT  Review and/or order of tests in the medicine section of CPT      I have personally and independently reviewed all pertinent labs, diagnostic studies, imaging, and have provided independent interpretation of the same.     Labs:     Recent Labs     10/10/24  1815 10/10/24  2026 10/11/24  0804 10/11/24  1159   WBC 14.0* 15.3*  --   --    HGB 12.0* 10.9* 8.1* 8.0*   HCT 35.0* 31.9* 23.9* 22.8*    162  --   --      Recent Labs     10/10/24  1815 10/11/24  0417   * 134*   K 3.5 3.9    103   CO2 25 27   BUN 16 11     Recent Labs     10/10/24  1815   ALT 24   GLOB 3.5     No results for input(s): \"INR\", \"APTT\" in the last 72 hours.    Invalid input(s): \"PTP\"   No results for input(s): \"TIBC\" in the last 72 hours.    Invalid input(s): \"FE\", \"PSAT\", \"FERR\"   No results found for: \"RBCF\"   No results for input(s): \"PH\", \"PCO2\", \"PO2\" in the last 72 hours.  No results for input(s): \"CPK\" in the last 72 hours.    Invalid input(s): \"CPKMB\", \"CKNDX\", \"TROIQ\"  No results found for: \"CHOL\", \"CHLST\", \"CHOLV\", \"HDL\", \"HDLC\", \"LDL\", \"LDLC\"  No results found for: \"GLUCPOC\"  [unfilled]    Notes reviewed from all clinical/nonclinical/nursing services involved in patient's clinical care. Care coordination discussions were held with appropriate clinical/nonclinical/ nursing providers based on care coordination needs.         Patients current active Medications were reviewed, considered, added and adjusted based on the clinical condition today.      Home Medications were reconciled to the best of my ability given

## 2024-10-11 NOTE — ED NOTES
Pt placed back on monitor after using restroom. HR sustained at 196 bpm. Dr Tatum and assist staff called to bedside. Crash cart to bedside an pt placed on pads. Pt aaox4. C/o abdominal pain and stating that he felt his heart racing

## 2024-10-11 NOTE — CONSULTS
Surgery Consult    Subjective:      Surgical consultation was called on Mich Forbes who is a 54 y.o. male with past medical history significant for alcohol dependence, previous episodes of pancreatitis, and prostate cancer.  He presented to the [facility] emergency department yesterday with complaints of upper abdominal pain and near syncopal episode due to pain.   Patient's pain is located epigastric and upper abdomen.   Pt reports no nausea and no vomiting and no fever or chills.  The pain is described as deep  and is 8/10 in intensity at its worst, he reports his pain has been worse In the past with other episodes.  . Pain is radiating to the back Onset was 3 days ago. Symptoms have been gradually worsening since.  This morning he said he is actually feeling better and is asking when he may be able to go home.  He received IV Dilaudid at 5 AM and now feels he no longer needs IV Dilaudid and is asking if he can get some oral Percocet instead.  Patient is on clears in the ED and is drinking water freely without any issues.  He had a normal bowel gas bowel movement yesterday and he is voiding well.  Patient denies any chest pain, he denies any shortness of breath, no fever no sick contacts.  Patient does drink alcohol daily and reports at least 5 drinks per day of beer and liquor.      Patient had concern for hemoperitoneum on first CT on admission.  IR was consulted for CTA that showed no active bleeding.  And his hemoglobin has been stable.  We are consulted for new diagnosis of ruptured pancreatic pseudocyst on admission CT.        PSH:  lap appy     Hx of  GERD and takes omeprazole PRN 2x per month   NKDA  He is a smoker.        Prior similar episodes:  yes      Past Medical History:   Diagnosis Date    Pancreatitis      Past Surgical History:   Procedure Laterality Date    APPENDECTOMY        Social History     Socioeconomic History    Marital status:    Tobacco Use    Smoking status: Every Day